# Patient Record
Sex: MALE | Race: WHITE | Employment: FULL TIME | URBAN - METROPOLITAN AREA
[De-identification: names, ages, dates, MRNs, and addresses within clinical notes are randomized per-mention and may not be internally consistent; named-entity substitution may affect disease eponyms.]

---

## 2017-06-15 DIAGNOSIS — E78.5 HYPERLIPIDEMIA LDL GOAL <130: ICD-10-CM

## 2017-06-15 NOTE — TELEPHONE ENCOUNTER
SIMVASTATIN 20MG     Last Written Prescription Date: 5/13/2016  Last Fill Quantity: 90, # refills: 4  Last Office Visit with G, P or [facility] prescribing provider: 11/30/2016       Lab Results   Component Value Date    CHOL 240 05/13/2016     Lab Results   Component Value Date    HDL 39 05/13/2016     Lab Results   Component Value Date     05/13/2016     Lab Results   Component Value Date    TRIG 326 05/13/2016     Lab Results   Component Value Date    CHOLHDLRATIO 5.6 01/23/2015

## 2017-06-16 ENCOUNTER — TRANSFERRED RECORDS (OUTPATIENT)
Dept: HEALTH INFORMATION MANAGEMENT | Facility: CLINIC | Age: 62
End: 2017-06-16

## 2017-06-19 RX ORDER — SIMVASTATIN 20 MG
20 TABLET ORAL AT BEDTIME
Qty: 30 TABLET | Refills: 0 | Status: SHIPPED | OUTPATIENT
Start: 2017-06-19 | End: 2017-07-07

## 2017-06-19 NOTE — TELEPHONE ENCOUNTER
Medication is being filled for 1 time refill only due to:  Patient needs to be seen because it has been more than one year since last visit.   Due for annual fasting labs and an office visit. Please call patient and help schedule this.   Chapis Nicole RN  Triage Nurse

## 2017-07-07 ENCOUNTER — OFFICE VISIT (OUTPATIENT)
Dept: PEDIATRICS | Facility: CLINIC | Age: 62
End: 2017-07-07
Payer: COMMERCIAL

## 2017-07-07 VITALS
SYSTOLIC BLOOD PRESSURE: 132 MMHG | HEART RATE: 68 BPM | HEIGHT: 66 IN | OXYGEN SATURATION: 98 % | BODY MASS INDEX: 29.41 KG/M2 | TEMPERATURE: 98 F | DIASTOLIC BLOOD PRESSURE: 74 MMHG | WEIGHT: 183 LBS

## 2017-07-07 DIAGNOSIS — F41.8 PERFORMANCE ANXIETY: ICD-10-CM

## 2017-07-07 DIAGNOSIS — Z00.00 ROUTINE GENERAL MEDICAL EXAMINATION AT A HEALTH CARE FACILITY: Primary | ICD-10-CM

## 2017-07-07 DIAGNOSIS — E78.5 HYPERLIPIDEMIA LDL GOAL <130: ICD-10-CM

## 2017-07-07 DIAGNOSIS — D22.9 SUSPICIOUS NEVUS: ICD-10-CM

## 2017-07-07 DIAGNOSIS — Z11.59 NEED FOR HEPATITIS C SCREENING TEST: ICD-10-CM

## 2017-07-07 LAB
ALBUMIN SERPL-MCNC: 3.9 G/DL (ref 3.4–5)
ALP SERPL-CCNC: 88 U/L (ref 40–150)
ALT SERPL W P-5'-P-CCNC: 27 U/L (ref 0–70)
ANION GAP SERPL CALCULATED.3IONS-SCNC: 9 MMOL/L (ref 3–14)
AST SERPL W P-5'-P-CCNC: 19 U/L (ref 0–45)
BILIRUB SERPL-MCNC: 0.5 MG/DL (ref 0.2–1.3)
BUN SERPL-MCNC: 18 MG/DL (ref 7–30)
CALCIUM SERPL-MCNC: 9.1 MG/DL (ref 8.5–10.1)
CHLORIDE SERPL-SCNC: 110 MMOL/L (ref 94–109)
CHOLEST SERPL-MCNC: 203 MG/DL
CO2 SERPL-SCNC: 24 MMOL/L (ref 20–32)
CREAT SERPL-MCNC: 1.06 MG/DL (ref 0.66–1.25)
GFR SERPL CREATININE-BSD FRML MDRD: 71 ML/MIN/1.7M2
GLUCOSE SERPL-MCNC: 110 MG/DL (ref 70–99)
HCV AB SERPL QL IA: NORMAL
HDLC SERPL-MCNC: 43 MG/DL
LDLC SERPL CALC-MCNC: 101 MG/DL
NONHDLC SERPL-MCNC: 160 MG/DL
POTASSIUM SERPL-SCNC: 4.3 MMOL/L (ref 3.4–5.3)
PROT SERPL-MCNC: 7.5 G/DL (ref 6.8–8.8)
PSA SERPL-ACNC: 2.16 UG/L (ref 0–4)
SODIUM SERPL-SCNC: 143 MMOL/L (ref 133–144)
TRIGL SERPL-MCNC: 296 MG/DL

## 2017-07-07 PROCEDURE — 36415 COLL VENOUS BLD VENIPUNCTURE: CPT | Performed by: INTERNAL MEDICINE

## 2017-07-07 PROCEDURE — 80053 COMPREHEN METABOLIC PANEL: CPT | Performed by: INTERNAL MEDICINE

## 2017-07-07 PROCEDURE — 80061 LIPID PANEL: CPT | Performed by: INTERNAL MEDICINE

## 2017-07-07 PROCEDURE — 99396 PREV VISIT EST AGE 40-64: CPT | Performed by: INTERNAL MEDICINE

## 2017-07-07 PROCEDURE — 86803 HEPATITIS C AB TEST: CPT | Performed by: INTERNAL MEDICINE

## 2017-07-07 PROCEDURE — G0103 PSA SCREENING: HCPCS | Performed by: INTERNAL MEDICINE

## 2017-07-07 RX ORDER — SIMVASTATIN 20 MG
20 TABLET ORAL AT BEDTIME
Qty: 90 TABLET | Refills: 3 | Status: SHIPPED | OUTPATIENT
Start: 2017-07-07 | End: 2018-08-06

## 2017-07-07 RX ORDER — PROPRANOLOL HYDROCHLORIDE 20 MG/1
10-40 TABLET ORAL 2 TIMES DAILY PRN
Qty: 30 TABLET | Refills: 11 | Status: SHIPPED | OUTPATIENT
Start: 2017-07-07 | End: 2021-09-03

## 2017-07-07 NOTE — PATIENT INSTRUCTIONS
Preventive Health Recommendations    Yearly exam:             See your health care provider every year in order to  o   Review health changes.   o   Discuss preventive care.    o   Review your medicines.     Have a cholesterol test every year.     Have a diabetes test (fasting glucose) every 1-2 years.    Shots: Get a flu shot each year. Get a tetanus shot every 10 years.     Nutrition:    Eat at least 5 servings of fruits and vegetables daily.     Eat whole-grain bread, whole-wheat pasta and brown rice instead of white grains and rice.     Get adequate Calcium and Vitamin D.     Lifestyle    Exercise for at least 150 minutes a week (30 minutes a day, 5 days a week). This will help you control your weight and prevent disease.     Limit alcohol to one drink per day.     No smoking.     Wear sunscreen to prevent skin cancer.     See your dentist every six months for an exam and cleaning.     See your eye doctor every 1 to 2 years.

## 2017-07-07 NOTE — MR AVS SNAPSHOT
After Visit Summary   7/7/2017    Guillermo Strauss    MRN: [id number]           Patient Information     Date Of Birth          1955        Visit Information        Provider Department      7/7/2017 10:00 AM Kelton Lopez MD [facility]        Today's Diagnoses     Routine general medical examination at a health care facility    -  1    Hyperlipidemia LDL goal <130        Suspicious nevus        Performance anxiety        Need for hepatitis C screening test          Care Instructions      Preventive Health Recommendations    Yearly exam:             See your health care provider every year in order to  o   Review health changes.   o   Discuss preventive care.    o   Review your medicines.     Have a cholesterol test every year.     Have a diabetes test (fasting glucose) every 1-2 years.    Shots: Get a flu shot each year. Get a tetanus shot every 10 years.     Nutrition:    Eat at least 5 servings of fruits and vegetables daily.     Eat whole-grain bread, whole-wheat pasta and brown rice instead of white grains and rice.     Get adequate Calcium and Vitamin D.     Lifestyle    Exercise for at least 150 minutes a week (30 minutes a day, 5 days a week). This will help you control your weight and prevent disease.     Limit alcohol to one drink per day.     No smoking.     Wear sunscreen to prevent skin cancer.     See your dentist every six months for an exam and cleaning.     See your eye doctor every 1 to 2 years.            Follow-ups after your visit        Additional Services     DERMATOLOGY REFERRAL       Your provider has referred you to:     [facility] [place] [phone]    [facility] [place] [phone]   [email or web address]    Please be aware that coverage of these services is subject to the terms and limitations of your health insurance plan.  Call member services at your health plan with any benefit or coverage questions.   "    Please bring the following with you to your appointment:    (1) Any X-Rays, CTs or MRIs which have been performed.  Contact the facility where they were done to arrange for  prior to your scheduled appointment.    (2) List of current medications  (3) This referral request   (4) Any documents/labs given to you for this referral                  Who to contact     If you have questions or need follow up information about today's clinic visit or your schedule please contact [facility] directly at [phone].  Normal or non-critical lab and imaging results will be communicated to you by Suliahart, letter or phone within 4 business days after the clinic has received the results. If you do not hear from us within 7 days, please contact the clinic through [employer] or phone. If you have a critical or abnormal lab result, we will notify you by phone as soon as possible.  Submit refill requests through ImpulseSave or call your pharmacy and they will forward the refill request to us. Please allow 3 business days for your refill to be completed.          Additional Information About Your Visit        SuliaharLeikr Information     ImpulseSave gives you secure access to your electronic health record. If you see a primary care provider, you can also send messages to your care team and make appointments. If you have questions, please call your primary care clinic.  If you do not have a primary care provider, please call [phone] and they will assist you.        Care EveryWhere ID     This is your Care EveryWhere ID. This could be used by other organizations to access your [place] medical records  [id number]        Your Vitals Were     Pulse Temperature Height Pulse Oximetry BMI (Body Mass Index)       68 98  F (36.7  C) (Oral) 5' 5.75\" (1.67 m) 98% 29.76 kg/m2        Blood Pressure from Last 3 Encounters:   07/07/17 132/74   05/13/16 138/80   01/23/15 134/86    Weight from Last 3 Encounters:   07/07/17 183 lb (83 kg) "   05/13/16 188 lb (85.3 kg)   01/23/15 185 lb (83.9 kg)              We Performed the Following     Comprehensive metabolic panel     DERMATOLOGY REFERRAL     Hepatitis C Screen Reflex to HCV RNA Quant and Genotype     Lipid Profile with reflex to direct LDL     PSA, screen          Today's Medication Changes          These changes are accurate as of: 7/7/17 10:34 AM.  If you have any questions, ask your nurse or doctor.               These medicines have changed or have updated prescriptions.        Dose/Directions    simvastatin 20 MG tablet   Commonly known as:  ZOCOR   This may have changed:  additional instructions   Used for:  Hyperlipidemia LDL goal <130   Changed by:  Kelton Lopez MD        Dose:  20 mg   Take 1 tablet (20 mg) by mouth At Bedtime   Quantity:  90 tablet   Refills:  3            Where to get your medicines      These medications were sent to [place] [address] [place] [address] [address]     Phone:  [phone]     propranolol 20 MG tablet    simvastatin 20 MG tablet                Primary Care Provider Office Phone # Fax #    Kelton Lopez [phone]       [facility] [address] [place] [address] [address] [place] [address]        Equal Access to Services     [facility]: Hadii aad ku hadasho Soomaali, waaxda luqadaha, qaybta kaalmada adeegyada, marcus fuller . So [facility] [phone].    ATENCIÓN: Si habla español, tiene a delgadillo disposición servicios gratuitos de asistencia lingüística. Llame al [phone].    We comply with applicable federal civil rights laws and [place] laws. We do not discriminate on the basis of race, color, national origin, age, disability sex, sexual orientation or gender identity.            Thank you!     Thank you for choosing [facility]  for your care. Our goal is always to provide you with excellent care. Hearing back from our patients is one way we can  continue to improve our services. Please take a few minutes to complete the written survey that you may receive in the mail after your visit with us. Thank you!             Your Updated Medication List - Protect others around you: Learn how to safely use, store and throw away your medicines at www.disposemymeds.org.          This list is accurate as of: 7/7/17 10:34 AM.  Always use your most recent med list.                   Brand Name Dispense Instructions for use Diagnosis    aspirin 81 MG tablet      1 tab po QD (Once per day)    Routine general medical examination at a health care facility       propranolol 20 MG tablet    INDERAL    30 tablet    Take 0.5-2 tablets (10-40 mg) by mouth 2 times daily as needed    Performance anxiety       simvastatin 20 MG tablet    ZOCOR    90 tablet    Take 1 tablet (20 mg) by mouth At Bedtime    Hyperlipidemia LDL goal <130

## 2017-07-07 NOTE — NURSING NOTE
"Chief Complaint   Patient presents with     Physical       Initial /88 (Cuff Size: Adult Regular)  Pulse 68  Temp 98  F (36.7  C) (Oral)  Ht 5' 5.75\" (1.67 m)  Wt 183 lb (83 kg)  SpO2 98%  BMI 29.76 kg/m2 Estimated body mass index is 29.76 kg/(m^2) as calculated from the following:    Height as of this encounter: 5' 5.75\" (1.67 m).    Weight as of this encounter: 183 lb (83 kg).  Medication Reconciliation: carli Navas   "

## 2018-08-06 DIAGNOSIS — E78.5 HYPERLIPIDEMIA LDL GOAL <130: ICD-10-CM

## 2018-08-06 NOTE — TELEPHONE ENCOUNTER
"Requested Prescriptions   Pending Prescriptions Disp Refills     simvastatin (ZOCOR) 20 MG tablet [Pharmacy Med Name: SIMVASTATIN 20 MG TABLET]    Last Written Prescription Date:  7/7/2017  Last Fill Quantity: 90,  # refills: 3   Last office visit: 7/7/2017 with prescribing provider:  Kelton Lopez     Future Office Visit:     90 tablet 3     Sig: TAKE 1 TABLET (20 MG) BY MOUTH AT BEDTIME    Statins Protocol Failed    8/6/2018  2:03 AM       Failed - LDL on file in past 12 months    Recent Labs   Lab Test  07/07/17   1037   LDL  101*            Failed - Recent (12 mo) or future (30 days) visit within the authorizing provider's specialty    Patient had office visit in the last 12 months or has a visit in the next 30 days with authorizing provider or within the authorizing provider's specialty.  See \"Patient Info\" tab in inbasket, or \"Choose Columns\" in Meds & Orders section of the refill encounter.           Passed - No abnormal creatine kinase in past 12 months    No lab results found.            Passed - Patient is age 18 or older          "

## 2018-08-07 RX ORDER — SIMVASTATIN 20 MG
TABLET ORAL
Qty: 90 TABLET | Refills: 0 | Status: SHIPPED | OUTPATIENT
Start: 2018-08-07 | End: 2018-08-22

## 2018-08-07 NOTE — TELEPHONE ENCOUNTER
Medication is being filled for 1 time refill only due to:  Patient needs labs fasting cholesterol. Patient needs to be seen because it has been more than one year since last visit. Call to schedule appointment with pcp, can do labs at that appointment      Rupali Lara RN

## 2018-08-22 ENCOUNTER — OFFICE VISIT (OUTPATIENT)
Dept: PEDIATRICS | Facility: CLINIC | Age: 63
End: 2018-08-22
Payer: COMMERCIAL

## 2018-08-22 VITALS
SYSTOLIC BLOOD PRESSURE: 134 MMHG | RESPIRATION RATE: 13 BRPM | HEIGHT: 66 IN | HEART RATE: 67 BPM | DIASTOLIC BLOOD PRESSURE: 78 MMHG | TEMPERATURE: 97.9 F | OXYGEN SATURATION: 97 %

## 2018-08-22 DIAGNOSIS — E78.5 HYPERLIPIDEMIA WITH TARGET LDL LESS THAN 130: Primary | ICD-10-CM

## 2018-08-22 LAB
ALBUMIN SERPL-MCNC: 3.8 G/DL (ref 3.4–5)
ALP SERPL-CCNC: 74 U/L (ref 40–150)
ALT SERPL W P-5'-P-CCNC: 25 U/L (ref 0–70)
ANION GAP SERPL CALCULATED.3IONS-SCNC: 6 MMOL/L (ref 3–14)
AST SERPL W P-5'-P-CCNC: 22 U/L (ref 0–45)
BILIRUB SERPL-MCNC: 0.4 MG/DL (ref 0.2–1.3)
BUN SERPL-MCNC: 16 MG/DL (ref 7–30)
CALCIUM SERPL-MCNC: 8.3 MG/DL (ref 8.5–10.1)
CHLORIDE SERPL-SCNC: 110 MMOL/L (ref 94–109)
CHOLEST SERPL-MCNC: 150 MG/DL
CO2 SERPL-SCNC: 26 MMOL/L (ref 20–32)
CREAT SERPL-MCNC: 0.96 MG/DL (ref 0.66–1.25)
GFR SERPL CREATININE-BSD FRML MDRD: 79 ML/MIN/1.7M2
GLUCOSE SERPL-MCNC: 114 MG/DL (ref 70–99)
HDLC SERPL-MCNC: 41 MG/DL
LDLC SERPL CALC-MCNC: 77 MG/DL
NONHDLC SERPL-MCNC: 109 MG/DL
POTASSIUM SERPL-SCNC: 4.1 MMOL/L (ref 3.4–5.3)
PROT SERPL-MCNC: 7.1 G/DL (ref 6.8–8.8)
SODIUM SERPL-SCNC: 142 MMOL/L (ref 133–144)
TRIGL SERPL-MCNC: 158 MG/DL

## 2018-08-22 PROCEDURE — 80061 LIPID PANEL: CPT | Performed by: INTERNAL MEDICINE

## 2018-08-22 PROCEDURE — 80053 COMPREHEN METABOLIC PANEL: CPT | Performed by: INTERNAL MEDICINE

## 2018-08-22 PROCEDURE — 99213 OFFICE O/P EST LOW 20 MIN: CPT | Performed by: INTERNAL MEDICINE

## 2018-08-22 PROCEDURE — 36415 COLL VENOUS BLD VENIPUNCTURE: CPT | Performed by: INTERNAL MEDICINE

## 2018-08-22 RX ORDER — SIMVASTATIN 20 MG
TABLET ORAL
Qty: 90 TABLET | Refills: 3 | Status: SHIPPED | OUTPATIENT
Start: 2018-08-22 | End: 2019-09-04

## 2018-08-22 NOTE — MR AVS SNAPSHOT
After Visit Summary   8/22/2018    Guillermo Strauss    MRN: [id number]           Patient Information     Date Of Birth          1955        Visit Information        Provider Department      8/22/2018 9:00 AM [facility] MD [facility]        Today's Diagnoses     Hyperlipidemia with target LDL less than 130    -  1      Care Instructions    Check labwork today   I will contact you with the results    Next routine visit in about 1 year          Follow-ups after your visit        Follow-up notes from your care team     Return in about 1 year (around 8/22/2019) for Medication Recheck.      Who to contact     If you have questions or need follow up information about today's clinic visit or your schedule please contact [facility] directly at [phone].  Normal or non-critical lab and imaging results will be communicated to you by MyChart, letter or phone within 4 business days after the clinic has received the results. If you do not hear from us within 7 days, please contact the clinic through Crowdparkhart or phone. If you have a critical or abnormal lab result, we will notify you by phone as soon as possible.  Submit refill requests through ClickTale or call your pharmacy and they will forward the refill request to us. Please allow 3 business days for your refill to be completed.          Additional Information About Your Visit        MyChart Information     ClickTale gives you secure access to your electronic health record. If you see a primary care provider, you can also send messages to your care team and make appointments. If you have questions, please call your primary care clinic.  If you do not have a primary care provider, please call [phone] and they will assist you.        Care EveryWhere ID     This is your Care EveryWhere ID. This could be used by other organizations to access your [place] medical records  [id number]        Your Vitals Were     Pulse Temperature  "Respirations Height Pulse Oximetry       67 97.9  F (36.6  C) (Tympanic) 13 5' 5.75\" (1.67 m) 97%        Blood Pressure from Last 3 Encounters:   08/22/18 134/78   07/07/17 132/74   05/13/16 138/80    Weight from Last 3 Encounters:   07/07/17 183 lb (83 kg)   05/13/16 188 lb (85.3 kg)   01/23/15 185 lb (83.9 kg)              We Performed the Following     Comprehensive metabolic panel     Lipid panel reflex to direct LDL Fasting          Where to get your medicines      These medications were sent to [id number] - [place], [place] - [address] AT [facility] [address]  [address], [place] [address]    Hours:  24-hours Phone:  [phone]     simvastatin 20 MG tablet          Primary Care Provider Office Phone # Fax #    Kelton Lopez [phone] [address] [place] [address]        Equal Access to Services     [facility]: Hadii aad ku hadasho Soomaali, waaxda luqadaha, qaybta kaalmada adeegyada, waxay mayelin fuller . So [facility] [phone].    ATENCIÓN: Si habla español, tiene a delgadillo disposición servicios gratuitos de asistencia lingüística. [facility] [phone].    We comply with applicable federal civil rights laws and [place] laws. We do not discriminate on the basis of race, color, national origin, age, disability, sex, sexual orientation, or gender identity.            Thank you!     Thank you for choosing [facility]  for your care. Our goal is always to provide you with excellent care. Hearing back from our patients is one way we can continue to improve our services. Please take a few minutes to complete the written survey that you may receive in the mail after your visit with us. Thank you!             Your Updated Medication List - Protect others around you: Learn how to safely use, store and throw away your medicines at www.disposemymeds.org.          This list is accurate as of 8/22/18  9:11 " AM.  Always use your most recent med list.                   Brand Name Dispense Instructions for use Diagnosis    aspirin 81 MG tablet      1 tab po QD (Once per day)    Routine general medical examination at a health care facility       propranolol 20 MG tablet    INDERAL    30 tablet    Take 0.5-2 tablets (10-40 mg) by mouth 2 times daily as needed    Performance anxiety       simvastatin 20 MG tablet    ZOCOR    90 tablet    TAKE 1 TABLET (20 MG) BY MOUTH AT BEDTIME    Hyperlipidemia with target LDL less than 130

## 2018-08-22 NOTE — PROGRESS NOTES
"  SUBJECTIVE:   Guillermo Strauss is a 63 year old male who presents to clinic today for the following health issues:    Followup of hyperlipidemia - treating with simvastatin    Taking Medication as prescribed: yes    Side Effects:  None    Medication Helping Symptoms:  Yes, as far as patient knows     Reviewed labs.  Lab Results   Component Value Date     07/07/2017     05/13/2016         Problem list and histories reviewed & adjusted, as indicated.    Labs reviewed in EPIC    Reviewed and updated as needed this visit by Provider  Tobacco  Allergies  Meds  Problems  Med Hx  Surg Hx  Fam Hx  Soc Hx          ROS:  Constitutional, HEENT, cardiovascular, pulmonary, gi and gu systems are negative, except as otherwise noted.    OBJECTIVE:     /78 (BP Location: Right arm, Patient Position: Chair, Cuff Size: Adult Regular)  Pulse 67  Temp 97.9  F (36.6  C) (Tympanic)  Resp 13  Ht 5' 5.75\" (1.67 m)  SpO2 97%  There is no height or weight on file to calculate BMI.  GEN: no distress  SKIN: no rashes  HEENT: PERRL. EOMI. TM's clear bilaterally. Nasal mucosa normal. OP moist without lesions.  NECK: Supple. No LAD or TM.  LUNGS: Clear to auscultation bilaterally. No rhonchi, rales, wheezes or retractions.  CV: Regular rate and rhythm.  No murmurs, rubs or gallops. Pulses 2+ radial.     ASSESSMENT/PLAN:       ICD-10-CM    1. Hyperlipidemia with target LDL less than 130 E78.5 simvastatin (ZOCOR) 20 MG tablet     Comprehensive metabolic panel     Lipid panel reflex to direct LDL Fasting     Patient Instructions   Check labwork today   I will contact you with the results    Next routine visit in about 1 year    Kelton Lopez MD  [facility]  "

## 2019-06-08 ENCOUNTER — OFFICE VISIT (OUTPATIENT)
Dept: URGENT CARE | Facility: URGENT CARE | Age: 64
End: 2019-06-08
Payer: COMMERCIAL

## 2019-06-08 ENCOUNTER — ANCILLARY PROCEDURE (OUTPATIENT)
Dept: GENERAL RADIOLOGY | Facility: CLINIC | Age: 64
End: 2019-06-08
Attending: FAMILY MEDICINE
Payer: COMMERCIAL

## 2019-06-08 VITALS
TEMPERATURE: 98.5 F | RESPIRATION RATE: 18 BRPM | OXYGEN SATURATION: 97 % | HEART RATE: 72 BPM | DIASTOLIC BLOOD PRESSURE: 84 MMHG | WEIGHT: 182 LBS | SYSTOLIC BLOOD PRESSURE: 167 MMHG | BODY MASS INDEX: 29.6 KG/M2
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
## 2019-06-08 DIAGNOSIS — S92.155A CLOSED NONDISPLACED AVULSION FRACTURE OF LEFT TALUS, INITIAL ENCOUNTER: Primary | ICD-10-CM

## 2019-06-08 PROCEDURE — 99214 OFFICE O/P EST MOD 30 MIN: CPT | Performed by: FAMILY MEDICINE

## 2019-06-08 PROCEDURE — 73630 X-RAY EXAM OF FOOT: CPT | Mod: LT

## 2019-06-08 NOTE — PATIENT INSTRUCTIONS
Patient Education     Understanding Ankle Sprain    The ankle is the joint where the leg and foot meet. Bones are held in place by connective tissue called ligaments. When ankle ligaments are stretched to the point of pain and injury, it is called an ankle sprain. A sprain can tear the ligaments. These tears can be very small but still cause pain. Ankle sprains can be mild or severe.  What causes an ankle sprain?  A sprain may occur when you twist your ankle or bend it too far. This can happen when you stumble or fall. Things that can make an ankle sprain more likely include:    Having had an ankle sprain before    Playing sports that involve running and jumping. Or playing contact sports such as football or hockey.    Wearing shoes that don t support your feet and ankles well    Having ankles with poor strength and flexibility  Symptoms of an ankle sprain  Symptoms may include:    Pain or soreness in the ankle    Swelling    Redness or bruising    Not being able to walk or put weight on the affected foot    Reduced range of motion in the ankle    A popping or tearing feeling at the time the sprain occurs    An abnormal or dislocated look to the ankle    Instability or too much range of motion in the ankle  Treatment for an ankle sprain  Treatment focuses on reducing pain and swelling, and avoiding further injury. Treatments may include:    Resting the ankle. Avoid putting weight on it. This may mean using crutches until the sprain heals.    Prescription or over-the-counter pain medicines. These help reduce swelling and pain.    Cold packs. These help reduce pain and swelling.    Raising your ankle above your heart. This helps reduce swelling.    Wrapping the ankle with an elastic bandage or ankle brace. This helps reduce swelling and gives some support to the ankle. In rare cases, you may need a cast or boot.    Stretching and other exercises. These improve flexibility and strength.    Heat packs. These may be  recommended before doing ankle exercises.  Possible complications of an ankle sprain  An ankle that has been weakened by a sprain can be more likely to have repeated sprains afterward. Doing exercises to strengthen your ankle and improve balance can reduce your risk for repeated sprains. Other possible complications are long-term (chronic) pain or an ankle that remains unstable.  When to call your healthcare provider  Call your healthcare provider right away if you have any of these:    Fever of 100.4 F (38 C) or higher, or as directed    Pain, numbness, discoloration, or coldness in the foot or toes    Pain that gets worse    Symptoms that don t get better, or get worse    New symptoms   Date Last Reviewed: 3/10/2016    7074-4287 [employer]. [address] [place] [address], [place] [address]. All rights reserved. This information is not intended as a substitute for professional medical care. Always follow your healthcare professional's instructions.

## 2019-06-08 NOTE — PROGRESS NOTES
SUBJECTIVE:   Chief Complaint   Patient presents with     Musculoskeletal Problem     left foot. pt took a fall/slipped. Happened this afternoon at 2. Reports it's painful to walk. Pt took 3 aspirin 1 hour ago.        Joint/Muscle Pain      Onset: a few hours ago    Injury?  Yes Details: while cleaning his garage he slipped and twisted his left foot.     Description:   Location(s): Left foot  Character: Sharp    Intensity: moderate, 8/10    Progression of Symptoms: same    Accompanying Signs & Symptoms:  Other symptoms: He endorsed swelling    History:   Previous similar pain: no      Worsened by    Overuse?: no  Morning Stiffness?:N/A    Alleviating factors:  Improved by: rest/inactivity  Therapies Tried and outcome: Aspirin    Review of Systems review of system negative except as mentioned in HPI.       Past Medical History:   Diagnosis Date     Other and unspecified hyperlipidemia      Preglaucoma, unspecified     follows at [facility]     Family History   Problem Relation Age of Onset     C.A.D. Mother         first MI at age 58     Diabetes No family hx of      Cerebrovascular Disease No family hx of      Cancer - colorectal No family hx of      Prostate Cancer No family hx of      Current Outpatient Medications   Medication Sig Dispense Refill     ASPIRIN 81 MG OR TABS 1 tab po QD (Once per day)       propranolol (INDERAL) 20 MG tablet Take 0.5-2 tablets (10-40 mg) by mouth 2 times daily as needed 30 tablet 11     simvastatin (ZOCOR) 20 MG tablet TAKE 1 TABLET (20 MG) BY MOUTH AT BEDTIME 90 tablet 3     Social History     Tobacco Use     Smoking status: Former Smoker     Smokeless tobacco: Never Used     Tobacco comment: quit in about 1985 after about 30 pack years   Substance Use Topics     Alcohol use: Yes     Alcohol/week: 0.0 oz       OBJECTIVE  /84 (BP Location: Left arm, Patient Position: Sitting, Cuff Size: Adult Large)   Pulse 72   Temp 98.5  F (36.9  C) (Oral)   Resp 18   Wt 82.6 kg (182 lb)    SpO2 97%   BMI 29.60 kg/m      Physical Exam   Constitutional: He appears well-developed and well-nourished. No distress.   Eyes: Conjunctivae are normal.   Musculoskeletal:        Left knee: Normal.        Left ankle: He exhibits swelling. He exhibits normal range of motion, no ecchymosis, no deformity, no laceration and normal pulse. Tenderness. Lateral malleolus and AITFL tenderness found. No medial malleolus, no CF ligament, no head of 5th metatarsal and no proximal fibula tenderness found.   Skin: Skin is warm. He is not diaphoretic.   Psychiatric: He has a normal mood and affect.       Labs:  No results found for this or any previous visit (from the past 24 hour(s)).    X-Ray was done, my findings are:  Possible avulsion fracture of the superor talus     ASSESSMENT:    Guillermo was seen today for urgent care and musculoskeletal problem.    Diagnoses and all orders for this visit:    Closed nondisplaced avulsion fracture of left talus, initial encounter  -     order for DME; Equipment being ordered: Cam boot  -     SPORTS MEDICINE REFERRAL  -     Tylenol/ibuprofen as needed for pain   -     Icing and rest     Other orders  -     XR Foot Left G/E 3 Views; Future    Donal Barrios MD

## 2019-09-04 DIAGNOSIS — E78.5 HYPERLIPIDEMIA WITH TARGET LDL LESS THAN 130: ICD-10-CM

## 2019-09-04 RX ORDER — SIMVASTATIN 20 MG
TABLET ORAL
Qty: 90 TABLET | Refills: 0 | Status: SHIPPED | OUTPATIENT
Start: 2019-09-04 | End: 2019-12-08

## 2019-09-04 NOTE — TELEPHONE ENCOUNTER
"Requested Prescriptions   Pending Prescriptions Disp Refills     simvastatin (ZOCOR) 20 MG tablet [Pharmacy Med Name: SIMVASTATIN 20MG TABLETS]    Last Written Prescription Date:  8/22/2018  Last Fill Quantity: 90,  # refills: 3   Last office visit: 8/22/2018 with prescribing provider:  Kelton Lopez     Future Office Visit:     90 tablet 0     Sig: TAKE 1 TABLET BY MOUTH AT BEDTIME       Statins Protocol Failed - 9/4/2019  3:51 AM        Failed - LDL on file in past 12 months     Recent Labs   Lab Test 08/22/18  0910   LDL 77             Failed - Recent (12 mo) or future (30 days) visit within the authorizing provider's specialty     Patient had office visit in the last 12 months or has a visit in the next 30 days with authorizing provider or within the authorizing provider's specialty.  See \"Patient Info\" tab in inbasket, or \"Choose Columns\" in Meds & Orders section of the refill encounter.              Passed - No abnormal creatine kinase in past 12 months     No lab results found.             Passed - Medication is active on med list        Passed - Patient is age 18 or older          "

## 2019-10-03 ENCOUNTER — HEALTH MAINTENANCE LETTER (OUTPATIENT)
Age: 64
End: 2019-10-03

## 2019-12-16 ENCOUNTER — HEALTH MAINTENANCE LETTER (OUTPATIENT)
Age: 64
End: 2019-12-16

## 2020-01-29 NOTE — PROGRESS NOTES
"SUBJECTIVE:   Guillermo Strauss is a 64 year old male who presents for Preventive Visit.    Are you in the first 12 months of your Medicare coverage?  No    Healthy Habits:     In general, how would you rate your overall health?  Good    Frequency of exercise:  4-5 days/week    Duration of exercise:  45-60 minutes    Do you usually eat at least 4 servings of fruit and vegetables a day, include whole grains    & fiber and avoid regularly eating high fat or \"junk\" foods?  Yes    Taking medications regularly:  Yes    Medication side effects:  None    Ability to successfully perform activities of daily living:  No assistance needed    Home Safety:  No safety concerns identified    Hearing Impairment:  Difficulty following a conversation in a noisy restaurant or crowded room and difficulty understanding soft or whispered speech    In the past 6 months, have you been bothered by leaking of urine?  No    In general, how would you rate your overall mental or emotional health?  Good      PHQ-2 Total Score: 0    Additional concerns today:  No    Do you feel safe in your environment? Yes    Have you ever done Advance Care Planning? (For example, a Health Directive, POLST, or a discussion with a medical provider or your loved ones about your wishes): No, advance care planning information given to patient to review.  Patient plans to discuss their wishes with loved ones or provider.      Hyperlipidemia. Tolerating statin.    Hx of smoking. Nothing for years. discussed AAA screen recommendation.     Fall risk  Fallen 2 or more times in the past year?: No  Any fall with injury in the past year?: No    Cognitive Screening   1) Repeat 3 items (Leader, Season, Table)    2) Clock draw: NORMAL  3) 3 item recall: Recalls 3 objects  Results: NORMAL clock, 1-2 items recalled: COGNITIVE IMPAIRMENT LESS LIKELY    Mini-CogTM Copyright LUPE Rodriguez. Licensed by the author for use in [facility]; reprinted with permission (cherry@.[facility]). " All rights reserved.      Do you have sleep apnea, excessive snoring or daytime drowsiness?: no    Reviewed and updated as needed this visit by Provider  Tobacco  Allergies  Meds  Problems  Med Hx  Surg Hx  Fam Hx        Social History     Tobacco Use     Smoking status: Former Smoker     Smokeless tobacco: Never Used     Tobacco comment: quit in about 1985 after about 30 pack years   Substance Use Topics     Alcohol use: Yes     Alcohol/week: 0.0 standard drinks     If you drink alcohol do you typically have >3 drinks per day or >7 drinks per week? No    Alcohol Use 1/31/2020   Prescreen: >3 drinks/day or >7 drinks/week? Yes   Prescreen: >3 drinks/day or >7 drinks/week? -   AUDIT SCORE  4     AUDIT - Alcohol Use Disorders Identification Test - Reproduced from the World Health Organization Audit 2001 (Second Edition) 1/31/2020   1.  How often do you have a drink containing alcohol? 2 to 3 times a week   2.  How many drinks containing alcohol do you have on a typical day when you are drinking? 3 or 4   3.  How often do you have five or more drinks on one occasion? Never   4.  How often during the last year have you found that you were not able to stop drinking once you had started? Never   5.  How often during the last year have you failed to do what was normally expected of you because of drinking? Never   6.  How often during the last year have you needed a first drink in the morning to get yourself going after a heavy drinking session? Never   7.  How often during the last year have you had a feeling of guilt or remorse after drinking? Never   8.  How often during the last year have you been unable to remember what happened the night before because of your drinking? Never   9.  Have you or someone else been injured because of your drinking? No   10. Has a relative, friend, doctor or other health care worker been concerned about your drinking or suggested you cut down? No   TOTAL SCORE 4         Current  "providers sharing in care for this patient include:   Patient Care Team:  Kelton Lopez MD as PCP - General  Kelton Lopez MD as Assigned PCP    The following health maintenance items are reviewed in Epic and correct as of today:  Health Maintenance   Topic Date Due     ANNUAL REVIEW OF HM ORDERS  1955     HIV SCREENING  06/23/1969     ZOSTER IMMUNIZATION (1 of 2) 06/23/2004     ADVANCE CARE PLANNING  07/25/2017     MEDICARE ANNUAL WELLNESS VISIT  06/23/2019     FALL RISK ASSESSMENT  06/23/2019     PNEUMOCOCCAL IMMUNIZATION 65+ LOW/MEDIUM RISK (1 of 2 - PCV13) 06/23/2019     AORTIC ANEURYSM SCREENING (SYSTEM ASSIGNED)  06/23/2019     PHQ-2  01/01/2020     DTAP/TDAP/TD IMMUNIZATION (4 - Td) 08/31/2020     LIPID  08/22/2023     COLONOSCOPY  02/01/2026     HEPATITIS C SCREENING  Completed     INFLUENZA VACCINE  Completed     IPV IMMUNIZATION  Aged Out     MENINGITIS IMMUNIZATION  Aged Out     Review of Systems   Constitutional: Negative for chills and fever.   HENT: Positive for hearing loss. Negative for congestion, ear pain and sore throat.    Eyes: Negative for pain and visual disturbance.   Respiratory: Negative for cough and shortness of breath.    Cardiovascular: Negative for chest pain and palpitations.   Gastrointestinal: Negative for abdominal pain, constipation, diarrhea, heartburn, hematochezia and nausea.   Genitourinary: Negative for discharge, dysuria, frequency, genital sores, hematuria, impotence and urgency.   Musculoskeletal: Positive for arthralgias. Negative for joint swelling and myalgias.   Skin: Negative for rash.   Neurological: Negative for dizziness, weakness, headaches and paresthesias.   Psychiatric/Behavioral: Negative for mood changes. The patient is not nervous/anxious.        OBJECTIVE:   /80   Pulse 73   Temp 98.5  F (36.9  C) (Oral)   Resp 16   Ht 1.702 m (5' 7\")   Wt 80.8 kg (178 lb 1.6 oz)   SpO2 96%   BMI 27.89 kg/m   Estimated body mass index is 27.89 kg/m  " "as calculated from the following:    Height as of this encounter: 1.702 m (5' 7\").    Weight as of this encounter: 80.8 kg (178 lb 1.6 oz).  Physical Exam  GENERAL: healthy, alert and no distress  EYES: Eyes grossly normal to inspection, PERRL and conjunctivae and sclerae normal  HENT: ear canals and TM's normal, nose and mouth without ulcers or lesions  NECK: no adenopathy, no asymmetry, masses, or scars and thyroid normal to palpation  RESP: lungs clear to auscultation - no rales, rhonchi or wheezes  CV: regular rate and rhythm, normal S1 S2, no S3 or S4, no murmur, click or rub, no peripheral edema and peripheral pulses strong  ABDOMEN: soft, nontender, no hepatosplenomegaly, no masses and bowel sounds normal  MS: no gross musculoskeletal defects noted, no edema  SKIN: no suspicious lesions or rashes  NEURO: Normal strength and tone, mentation intact and speech normal  PSYCH: mentation appears normal, affect normal/bright      ASSESSMENT / PLAN:       ICD-10-CM    1. Routine general medical examination at a health care facility Z00.00 Comprehensive metabolic panel     Lipid panel reflex to direct LDL Fasting     Prostate spec antigen screen   2. Hyperlipidemia with target LDL less than 130 E78.5 simvastatin (ZOCOR) 20 MG tablet     Lipid panel reflex to direct LDL Fasting   3. Personal history of tobacco use, presenting hazards to health Z87.891 US Abdominal Aorta Imaging       COUNSELING:  Reviewed preventive health counseling, as reflected in patient instructions    Estimated body mass index is 27.89 kg/m  as calculated from the following:    Height as of this encounter: 1.702 m (5' 7\").    Weight as of this encounter: 80.8 kg (178 lb 1.6 oz).         reports that he has quit smoking. He has never used smokeless tobacco.      Appropriate preventive services were discussed with this patient, including applicable screening as appropriate for cardiovascular disease, diabetes, osteopenia/osteoporosis, and glaucoma. "  As appropriate for age/gender, discussed screening for colorectal cancer, prostate cancer. Checklist reviewing preventive services available has been given to the patient.    Reviewed patients plan of care and provided an AVS. The Basic Care Plan (routine screening as documented in Health Maintenance) for Guillermo meets the Care Plan requirement. This Care Plan has been established and reviewed with the Patient.    Counseling Resources:  ATP IV Guidelines  Pooled Cohorts Equation Calculator  Breast Cancer Risk Calculator  FRAX Risk Assessment  ICSI Preventive Guidelines  Dietary Guidelines for Americans, 2010  USDA's MyPlate  ASA Prophylaxis  Lung CA Screening    Kelton Lopez MD  [facility]    Identified Health Risks:

## 2020-01-29 NOTE — PATIENT INSTRUCTIONS
Check labwork today   I will contact you with the results    You will be contacted to schedule an ultrasound of your abdominal aorta   Or call Radiology [phone]       Patient Education   Personalized Prevention Plan  You are due for the preventive services outlined below.  Your care team is available to assist you in scheduling these services.  If you have already completed any of these items, please share that information with your care team to update in your medical record.  Health Maintenance Due   Topic Date Due     Zoster (Shingles) Vaccine (1 of 2) 2004     Discuss Advance Care Planning  2017     Pneumococcal Vaccine (1 of 2 - PCV13) 2019     AORTIC ANEURYSM SCREENING (SYSTEM ASSIGNED)  2019         Vaccine recommendations:  Shingles vaccine and Pneumonia vaccine    2 moths later -  Shingles booster and Tetanus booster

## 2020-01-30 ENCOUNTER — PRE VISIT (OUTPATIENT)
Dept: PEDIATRICS | Facility: CLINIC | Age: 65
End: 2020-01-30

## 2020-01-30 NOTE — TELEPHONE ENCOUNTER
Pre-Visit Planning     Future Appointments   Date Time Provider Department Center   1/31/2020  9:50 AM Kelton Lopez MD [facility]     Arrival Time for this Appointment:    Appointment Notes for this encounter:   Data Unavailable    Questionnaires Reviewed/Assigned  No additional questionnaires are needed        Patient preferred phone number: [phone]    Unable to reach. Left voicemail confirming appointment.

## 2020-01-31 ENCOUNTER — OFFICE VISIT (OUTPATIENT)
Dept: PEDIATRICS | Facility: CLINIC | Age: 65
End: 2020-01-31
Payer: COMMERCIAL

## 2020-01-31 VITALS
BODY MASS INDEX: 27.95 KG/M2 | DIASTOLIC BLOOD PRESSURE: 80 MMHG | TEMPERATURE: 98.5 F | OXYGEN SATURATION: 96 % | SYSTOLIC BLOOD PRESSURE: 136 MMHG | HEIGHT: 67 IN | WEIGHT: 178.1 LBS | RESPIRATION RATE: 16 BRPM | HEART RATE: 73 BPM

## 2020-01-31 DIAGNOSIS — Z00.00 ROUTINE GENERAL MEDICAL EXAMINATION AT A HEALTH CARE FACILITY: Primary | ICD-10-CM

## 2020-01-31 DIAGNOSIS — Z87.891 PERSONAL HISTORY OF TOBACCO USE, PRESENTING HAZARDS TO HEALTH: ICD-10-CM

## 2020-01-31 DIAGNOSIS — E78.5 HYPERLIPIDEMIA WITH TARGET LDL LESS THAN 130: ICD-10-CM

## 2020-01-31 LAB
ALBUMIN SERPL-MCNC: 4 G/DL (ref 3.4–5)
ALP SERPL-CCNC: 87 U/L (ref 40–150)
ALT SERPL W P-5'-P-CCNC: 33 U/L (ref 0–70)
ANION GAP SERPL CALCULATED.3IONS-SCNC: 6 MMOL/L (ref 3–14)
AST SERPL W P-5'-P-CCNC: 21 U/L (ref 0–45)
BILIRUB SERPL-MCNC: 0.4 MG/DL (ref 0.2–1.3)
BUN SERPL-MCNC: 18 MG/DL (ref 7–30)
CALCIUM SERPL-MCNC: 9.1 MG/DL (ref 8.5–10.1)
CHLORIDE SERPL-SCNC: 107 MMOL/L (ref 94–109)
CHOLEST SERPL-MCNC: 171 MG/DL
CO2 SERPL-SCNC: 27 MMOL/L (ref 20–32)
CREAT SERPL-MCNC: 1.04 MG/DL (ref 0.66–1.25)
GFR SERPL CREATININE-BSD FRML MDRD: 75 ML/MIN/{1.73_M2}
GLUCOSE SERPL-MCNC: 119 MG/DL (ref 70–99)
HDLC SERPL-MCNC: 38 MG/DL
LDLC SERPL CALC-MCNC: 84 MG/DL
NONHDLC SERPL-MCNC: 133 MG/DL
POTASSIUM SERPL-SCNC: 4 MMOL/L (ref 3.4–5.3)
PROT SERPL-MCNC: 7.4 G/DL (ref 6.8–8.8)
PSA SERPL-ACNC: 2.13 UG/L (ref 0–4)
SODIUM SERPL-SCNC: 140 MMOL/L (ref 133–144)
TRIGL SERPL-MCNC: 246 MG/DL

## 2020-01-31 PROCEDURE — 80053 COMPREHEN METABOLIC PANEL: CPT | Performed by: INTERNAL MEDICINE

## 2020-01-31 PROCEDURE — 36415 COLL VENOUS BLD VENIPUNCTURE: CPT | Performed by: INTERNAL MEDICINE

## 2020-01-31 PROCEDURE — 99397 PER PM REEVAL EST PAT 65+ YR: CPT | Performed by: INTERNAL MEDICINE

## 2020-01-31 PROCEDURE — 80061 LIPID PANEL: CPT | Performed by: INTERNAL MEDICINE

## 2020-01-31 PROCEDURE — G0103 PSA SCREENING: HCPCS | Performed by: INTERNAL MEDICINE

## 2020-01-31 RX ORDER — SIMVASTATIN 20 MG
TABLET ORAL
Qty: 90 TABLET | Refills: 3 | Status: SHIPPED | OUTPATIENT
Start: 2020-01-31 | End: 2021-03-02

## 2020-01-31 ASSESSMENT — ENCOUNTER SYMPTOMS
CONSTIPATION: 0
HEMATURIA: 0
EYE PAIN: 0
HEADACHES: 0
NERVOUS/ANXIOUS: 0
SORE THROAT: 0
DIZZINESS: 0
ARTHRALGIAS: 1
WEAKNESS: 0
DIARRHEA: 0
JOINT SWELLING: 0
ABDOMINAL PAIN: 0
PALPITATIONS: 0
COUGH: 0
NAUSEA: 0
MYALGIAS: 0
SHORTNESS OF BREATH: 0
FEVER: 0
FREQUENCY: 0
HEMATOCHEZIA: 0
HEARTBURN: 0
CHILLS: 0
PARESTHESIAS: 0
DYSURIA: 0

## 2020-01-31 ASSESSMENT — MIFFLIN-ST. JEOR: SCORE: 1551.49

## 2020-01-31 ASSESSMENT — ACTIVITIES OF DAILY LIVING (ADL): CURRENT_FUNCTION: NO ASSISTANCE NEEDED

## 2020-02-14 ENCOUNTER — HOSPITAL ENCOUNTER (OUTPATIENT)
Dept: ULTRASOUND IMAGING | Facility: CLINIC | Age: 65
Discharge: HOME | End: 2020-02-14
Attending: INTERNAL MEDICINE | Admitting: INTERNAL MEDICINE
Payer: COMMERCIAL

## 2020-02-14 DIAGNOSIS — Z87.891 PERSONAL HISTORY OF TOBACCO USE, PRESENTING HAZARDS TO HEALTH: ICD-10-CM

## 2020-02-14 PROCEDURE — 76775 US EXAM ABDO BACK WALL LIM: CPT

## 2020-06-30 ENCOUNTER — OFFICE VISIT (OUTPATIENT)
Dept: PEDIATRICS | Facility: CLINIC | Age: 65
End: 2020-06-30
Payer: COMMERCIAL

## 2020-06-30 ENCOUNTER — TELEPHONE (OUTPATIENT)
Dept: PEDIATRICS | Facility: CLINIC | Age: 65
End: 2020-06-30

## 2020-06-30 VITALS
OXYGEN SATURATION: 98 % | DIASTOLIC BLOOD PRESSURE: 86 MMHG | BODY MASS INDEX: 28.93 KG/M2 | WEIGHT: 180 LBS | SYSTOLIC BLOOD PRESSURE: 160 MMHG | TEMPERATURE: 97.8 F | HEART RATE: 76 BPM | RESPIRATION RATE: 16 BRPM | HEIGHT: 66 IN

## 2020-06-30 DIAGNOSIS — I10 BENIGN ESSENTIAL HYPERTENSION: ICD-10-CM

## 2020-06-30 PROCEDURE — 99213 OFFICE O/P EST LOW 20 MIN: CPT | Performed by: INTERNAL MEDICINE

## 2020-06-30 SDOH — HEALTH STABILITY: MENTAL HEALTH: HOW MANY DRINKS CONTAINING ALCOHOL DO YOU HAVE ON A TYPICAL DAY WHEN YOU ARE DRINKING?: 1 OR 2

## 2020-06-30 SDOH — HEALTH STABILITY: MENTAL HEALTH: HOW OFTEN DO YOU HAVE SIX OR MORE DRINKS ON ONE OCCASION?: NEVER

## 2020-06-30 SDOH — HEALTH STABILITY: MENTAL HEALTH: HOW OFTEN DO YOU HAVE A DRINK CONTAINING ALCOHOL?: MONTHLY OR LESS

## 2020-06-30 ASSESSMENT — MIFFLIN-ST. JEOR: SCORE: 1539.22

## 2020-06-30 NOTE — TELEPHONE ENCOUNTER
Called patient to schedule appointment with Dr. Lopez for elevated blood pressures (please see patient's original Zukihart message).  Left message requesting to schedule an in-office visit for this afternoon or tomorrow afternoon.  Provided direct number for scheduling.  Upon return call, please schedule patient in a virtual slot with Dr. Lopez this afternoon for office visit, or tomorrow afternoon if no availability.  Additionally sent Zukihart message as well to patient with above scheduling instructions.     Kelsey Lynch

## 2020-06-30 NOTE — PATIENT INSTRUCTIONS
Increase your exercise regimen   Goal of 30+ minutes per day, 4+ days per week    Check your blood pressure periodically   Optimal BP readings: <130 / <85   Too high: >140 / >90   Very high: >180 / >105    If your pressures continue to be high over the next 2-3 months, plan to start a daily medication for your blood pressure    Options:   Long acting propranolol    Lisinopril

## 2020-06-30 NOTE — PROGRESS NOTES
"Subjective     Guillermo Strauss is a 65 year old male who presents to clinic today for the following health issues:    HPI   Hypertension Follow-up      Do you check your blood pressure regularly outside of the clinic? Yes     Are you following a low salt diet? Yes    Are your blood pressures ever more than 140 on the top number (systolic) OR more   than 90 on the bottom number (diastolic), for example 140/90? Yes      How many servings of fruits and vegetables do you eat daily?  4 or more    On average, how many sweetened beverages do you drink each day (Examples: soda, juice, sweet tea, etc.  Do NOT count diet or artificially sweetened beverages)?   0    How many days per week do you exercise enough to make your heart beat faster? 7    How many minutes a day do you exercise enough to make your heart beat faster? 30 - 60    How many days per week do you miss taking your medication? 0    BP Readings from Last 3 Encounters:   06/30/20 (!) 160/86   01/31/20 136/80   06/08/19 167/84     No prior treatment for high blood pressure.  Is treated for anxiety prn with propranolol.    Has been checking BP at home daily for the past few weeks.  Readings typically 1405-160 / 90's    No cardiac sx such as CP, palpitations, PND, orthopnea, HILLS or peripheral edema.  Discussed options to treat high blood pressure.             Objective    BP (!) 160/86 (BP Location: Right arm, Patient Position: Sitting, Cuff Size: Adult Regular)   Pulse 76   Temp 97.8  F (36.6  C) (Tympanic)   Resp 16   Ht 1.676 m (5' 6\")   Wt 81.6 kg (180 lb)   SpO2 98%   BMI 29.05 kg/m    Body mass index is 29.05 kg/m .  Physical Exam   GEN: No distress  SKIN: No rashes noted  HEENT: PERRL. No nasal discharge.   NECK: Supple  LUNGS: CTA frank.  CV: Regular rate and rhythm.  No murmurs, rubs or gallops.  EXTR: no edema          Assessment & Plan       ICD-10-CM    1. Benign essential hypertension  I10      New formal dx of HTN.  As above, discussed management " options.  Since is stage 1, OK to start w/ lifestyle changes especially as his exercise regimen has been reduced d/t social distancing.    Patient Instructions   Increase your exercise regimen   Goal of 30+ minutes per day, 4+ days per week    Check your blood pressure periodically   Optimal BP readings: <130 / <85   Too high: >140 / >90   Very high: >180 / >105    If your pressures continue to be high over the next 2-3 months, plan to start a daily medication for your blood pressure    Options:   Long acting propranolol    Lisinopril       Kelton Lopez MD  [facility]

## 2021-01-15 ENCOUNTER — HEALTH MAINTENANCE LETTER (OUTPATIENT)
Age: 66
End: 2021-01-15

## 2021-02-28 DIAGNOSIS — E78.5 HYPERLIPIDEMIA WITH TARGET LDL LESS THAN 130: ICD-10-CM

## 2021-03-02 RX ORDER — SIMVASTATIN 20 MG
TABLET ORAL
Qty: 90 TABLET | Refills: 0 | Status: SHIPPED | OUTPATIENT
Start: 2021-03-02 | End: 2021-06-01

## 2021-03-02 NOTE — TELEPHONE ENCOUNTER
Called patient and he would like an early Friday am appointment. Was unable to commit to April but will check his schedule and call back.  Ginny Bennett LPN

## 2021-03-02 NOTE — TELEPHONE ENCOUNTER
Medication is being filled for 1 time refill only due to:  Patient needs to be seen because it has been more than one year since last visit for fasting labs, also needs BP recheck  Routed to TC for scheduling  Prescription approved per [facility] Refill Protocol.  Grace Dalal RN, BSN  Message handled by CLINIC NURSE.

## 2021-03-21 ENCOUNTER — HEALTH MAINTENANCE LETTER (OUTPATIENT)
Age: 66
End: 2021-03-21

## 2021-05-31 DIAGNOSIS — E78.5 HYPERLIPIDEMIA WITH TARGET LDL LESS THAN 130: ICD-10-CM

## 2021-06-01 RX ORDER — SIMVASTATIN 20 MG
TABLET ORAL
Qty: 30 TABLET | Refills: 0 | Status: SHIPPED | OUTPATIENT
Start: 2021-06-01 | End: 2021-07-02

## 2021-07-02 DIAGNOSIS — E78.5 HYPERLIPIDEMIA WITH TARGET LDL LESS THAN 130: ICD-10-CM

## 2021-07-02 RX ORDER — SIMVASTATIN 20 MG
TABLET ORAL
Qty: 30 TABLET | Refills: 0 | Status: SHIPPED | OUTPATIENT
Start: 2021-07-02 | End: 2021-08-05

## 2021-07-02 NOTE — TELEPHONE ENCOUNTER
1st attempt: left VM to return call. If pt calls back, please assist in scheduling annual physical with PCP. Needs visit prior to further refills.     Kimberly Zhang MA 3:46 PM 7/2/2021

## 2021-07-02 NOTE — TELEPHONE ENCOUNTER
Routing refill request to provider for review/approval because:  Labs not current:  LDL  Patient needs to be seen because it has been more than 1 year since last office visit.    Deisy Domingo RN

## 2021-07-02 NOTE — LETTER
[facility]  [address]  [address]  [place] [address]  Phone: [phone]  Fax: [phone]        July 9, 2021      Guillermo Strauss                                                                                                                                [address]  [place] [address]            Dear Mr. Strauss,    We are concerned about your health care. We recently provided you with a medication refill. Many medications require routine follow-up with your Doctor.      At this time we ask that: You schedule an appointment for your annual physical.    Your prescription: Has been refilled for 1 month so you may have time for the above noted follow-up. You will need to schedule an appointment prior to further refills.     Please call us at [phone] to set up an appointment or if you have further questions.     Thank you,      [facility] Care Team

## 2021-08-04 DIAGNOSIS — E78.5 HYPERLIPIDEMIA WITH TARGET LDL LESS THAN 130: ICD-10-CM

## 2021-08-05 RX ORDER — SIMVASTATIN 20 MG
TABLET ORAL
Qty: 30 TABLET | Refills: 0 | Status: SHIPPED | OUTPATIENT
Start: 2021-08-05 | End: 2021-09-03

## 2021-08-06 ENCOUNTER — TRANSFERRED RECORDS (OUTPATIENT)
Dept: HEALTH INFORMATION MANAGEMENT | Facility: CLINIC | Age: 66
End: 2021-08-06

## 2021-09-03 ENCOUNTER — OFFICE VISIT (OUTPATIENT)
Dept: PEDIATRICS | Facility: CLINIC | Age: 66
End: 2021-09-03
Payer: COMMERCIAL

## 2021-09-03 VITALS
BODY MASS INDEX: 28.82 KG/M2 | HEART RATE: 61 BPM | SYSTOLIC BLOOD PRESSURE: 132 MMHG | RESPIRATION RATE: 18 BRPM | WEIGHT: 179.3 LBS | HEIGHT: 66 IN | DIASTOLIC BLOOD PRESSURE: 84 MMHG | TEMPERATURE: 97.4 F | OXYGEN SATURATION: 98 %

## 2021-09-03 DIAGNOSIS — Z00.00 ENCOUNTER FOR MEDICARE ANNUAL WELLNESS EXAM: Primary | ICD-10-CM

## 2021-09-03 DIAGNOSIS — Z12.5 SCREENING FOR PROSTATE CANCER: ICD-10-CM

## 2021-09-03 DIAGNOSIS — E78.5 HYPERLIPIDEMIA WITH TARGET LDL LESS THAN 130: ICD-10-CM

## 2021-09-03 LAB
ALBUMIN SERPL-MCNC: 3.5 G/DL (ref 3.4–5)
ALP SERPL-CCNC: 73 U/L (ref 40–150)
ALT SERPL W P-5'-P-CCNC: 29 U/L (ref 0–70)
ANION GAP SERPL CALCULATED.3IONS-SCNC: 8 MMOL/L (ref 3–14)
AST SERPL W P-5'-P-CCNC: 22 U/L (ref 0–45)
BILIRUB SERPL-MCNC: 0.4 MG/DL (ref 0.2–1.3)
BUN SERPL-MCNC: 16 MG/DL (ref 7–30)
CALCIUM SERPL-MCNC: 8.8 MG/DL (ref 8.5–10.1)
CHLORIDE BLD-SCNC: 108 MMOL/L (ref 94–109)
CHOLEST SERPL-MCNC: 165 MG/DL
CO2 SERPL-SCNC: 23 MMOL/L (ref 20–32)
CREAT SERPL-MCNC: 1.01 MG/DL (ref 0.66–1.25)
FASTING STATUS PATIENT QL REPORTED: YES
GFR SERPL CREATININE-BSD FRML MDRD: 77 ML/MIN/1.73M2
GLUCOSE BLD-MCNC: 109 MG/DL (ref 70–99)
HDLC SERPL-MCNC: 40 MG/DL
LDLC SERPL CALC-MCNC: 98 MG/DL
NONHDLC SERPL-MCNC: 125 MG/DL
POTASSIUM BLD-SCNC: 3.8 MMOL/L (ref 3.4–5.3)
PROT SERPL-MCNC: 6.9 G/DL (ref 6.8–8.8)
PSA SERPL-MCNC: 2.65 UG/L (ref 0–4)
SODIUM SERPL-SCNC: 139 MMOL/L (ref 133–144)
TRIGL SERPL-MCNC: 137 MG/DL

## 2021-09-03 PROCEDURE — 36415 COLL VENOUS BLD VENIPUNCTURE: CPT | Performed by: INTERNAL MEDICINE

## 2021-09-03 PROCEDURE — G0402 INITIAL PREVENTIVE EXAM: HCPCS | Performed by: INTERNAL MEDICINE

## 2021-09-03 PROCEDURE — G0103 PSA SCREENING: HCPCS | Performed by: INTERNAL MEDICINE

## 2021-09-03 PROCEDURE — 80061 LIPID PANEL: CPT | Performed by: INTERNAL MEDICINE

## 2021-09-03 PROCEDURE — 80053 COMPREHEN METABOLIC PANEL: CPT | Performed by: INTERNAL MEDICINE

## 2021-09-03 RX ORDER — SIMVASTATIN 20 MG
TABLET ORAL
Qty: 90 TABLET | Refills: 3 | Status: SHIPPED | OUTPATIENT
Start: 2021-09-03 | End: 2022-09-20

## 2021-09-03 SDOH — ECONOMIC STABILITY: TRANSPORTATION INSECURITY
IN THE PAST 12 MONTHS, HAS LACK OF TRANSPORTATION KEPT YOU FROM MEETINGS, WORK, OR FROM GETTING THINGS NEEDED FOR DAILY LIVING?: NO

## 2021-09-03 SDOH — ECONOMIC STABILITY: TRANSPORTATION INSECURITY
IN THE PAST 12 MONTHS, HAS THE LACK OF TRANSPORTATION KEPT YOU FROM MEDICAL APPOINTMENTS OR FROM GETTING MEDICATIONS?: NO

## 2021-09-03 SDOH — HEALTH STABILITY: PHYSICAL HEALTH: ON AVERAGE, HOW MANY DAYS PER WEEK DO YOU ENGAGE IN MODERATE TO STRENUOUS EXERCISE (LIKE A BRISK WALK)?: 4 DAYS

## 2021-09-03 SDOH — ECONOMIC STABILITY: FOOD INSECURITY: WITHIN THE PAST 12 MONTHS, YOU WORRIED THAT YOUR FOOD WOULD RUN OUT BEFORE YOU GOT MONEY TO BUY MORE.: NEVER TRUE

## 2021-09-03 SDOH — ECONOMIC STABILITY: INCOME INSECURITY: HOW HARD IS IT FOR YOU TO PAY FOR THE VERY BASICS LIKE FOOD, HOUSING, MEDICAL CARE, AND HEATING?: NOT HARD AT ALL

## 2021-09-03 SDOH — ECONOMIC STABILITY: FOOD INSECURITY: WITHIN THE PAST 12 MONTHS, THE FOOD YOU BOUGHT JUST DIDN'T LAST AND YOU DIDN'T HAVE MONEY TO GET MORE.: NEVER TRUE

## 2021-09-03 SDOH — ECONOMIC STABILITY: INCOME INSECURITY: IN THE LAST 12 MONTHS, WAS THERE A TIME WHEN YOU WERE NOT ABLE TO PAY THE MORTGAGE OR RENT ON TIME?: NO

## 2021-09-03 SDOH — HEALTH STABILITY: PHYSICAL HEALTH: ON AVERAGE, HOW MANY MINUTES DO YOU ENGAGE IN EXERCISE AT THIS LEVEL?: 50 MIN

## 2021-09-03 ASSESSMENT — ENCOUNTER SYMPTOMS
HEADACHES: 0
ABDOMINAL PAIN: 0
PALPITATIONS: 0
DIARRHEA: 0
SORE THROAT: 0
COUGH: 0
HEARTBURN: 0
PARESTHESIAS: 0
MYALGIAS: 1
FREQUENCY: 0
EYE PAIN: 0
NAUSEA: 0
HEMATURIA: 0
CONSTIPATION: 0
NERVOUS/ANXIOUS: 0
ARTHRALGIAS: 1
SHORTNESS OF BREATH: 0
HEMATOCHEZIA: 0
DIZZINESS: 0
WEAKNESS: 0
FEVER: 0
JOINT SWELLING: 0
DYSURIA: 0
CHILLS: 0

## 2021-09-03 ASSESSMENT — MIFFLIN-ST. JEOR: SCORE: 1534.54

## 2021-09-03 ASSESSMENT — SOCIAL DETERMINANTS OF HEALTH (SDOH)
IN A TYPICAL WEEK, HOW MANY TIMES DO YOU TALK ON THE PHONE WITH FAMILY, FRIENDS, OR NEIGHBORS?: THREE TIMES A WEEK
DO YOU BELONG TO ANY CLUBS OR ORGANIZATIONS SUCH AS CHURCH GROUPS UNIONS, FRATERNAL OR ATHLETIC GROUPS, OR SCHOOL GROUPS?: NO
HOW OFTEN DO YOU GET TOGETHER WITH FRIENDS OR RELATIVES?: TWICE A WEEK
HOW OFTEN DO YOU ATTEND CHURCH OR RELIGIOUS SERVICES?: 1 TO 4 TIMES PER YEAR

## 2021-09-03 ASSESSMENT — LIFESTYLE VARIABLES
HOW OFTEN DO YOU HAVE A DRINK CONTAINING ALCOHOL: 2-3 TIMES A WEEK
HOW MANY STANDARD DRINKS CONTAINING ALCOHOL DO YOU HAVE ON A TYPICAL DAY: 1 OR 2
HOW OFTEN DO YOU HAVE SIX OR MORE DRINKS ON ONE OCCASION: NEVER

## 2021-09-03 ASSESSMENT — ACTIVITIES OF DAILY LIVING (ADL): CURRENT_FUNCTION: NO ASSISTANCE NEEDED

## 2021-09-03 NOTE — PATIENT INSTRUCTIONS
Patient Education   Personalized Prevention Plan  You are due for the preventive services outlined below.  Your care team is available to assist you in scheduling these services.  If you have already completed any of these items, please share that information with your care team to update in your medical record.  Health Maintenance Due   Topic Date Due     ANNUAL REVIEW OF HM ORDERS  Never done     Pneumococcal Vaccine (1 of 1 - PPSV23) Never done     Diptheria Tetanus Pertussis (DTAP/TDAP/TD) Vaccine (4 - Td or Tdap) 08/31/2020     Annual Wellness Visit  01/31/2021     FALL RISK ASSESSMENT  01/31/2021     Flu Vaccine (1) 09/01/2021

## 2021-09-03 NOTE — PROGRESS NOTES
"SUBJECTIVE:   Guillermo Strauss is a 66 year old male who presents for Preventive Visit.      Patient has been advised of split billing requirements and indicates understanding: Yes   Are you in the first 12 months of your Medicare coverage?  Yes,  Visual Acuity:  Right Eye: 20/25   Left Eye: 20/20  Both Eyes: 20/25    Healthy Habits:     In general, how would you rate your overall health?  Good    Frequency of exercise:  4-5 days/week    Do you usually eat at least 4 servings of fruit and vegetables a day, include whole grains    & fiber and avoid regularly eating high fat or \"junk\" foods?  Yes    Taking medications regularly:  Yes    Medication side effects:  Not applicable    Ability to successfully perform activities of daily living:  No assistance needed    Home Safety:  No safety concerns identified    Hearing Impairment:  No hearing concerns    In the past 6 months, have you been bothered by leaking of urine? Yes    In general, how would you rate your overall mental or emotional health?  Good      PHQ-2 Total Score: 0    Additional concerns today:  No    Do you feel safe in your environment? Yes    Have you ever done Advance Care Planning? (For example, a Health Directive, POLST, or a discussion with a medical provider or your loved ones about your wishes): No, advance care planning information given to patient to review.  Patient declined advance care planning discussion at this time.     Hyperlipidemia. Treating w/ statin. No med side effects noted. Is fasting this am.  Lab Results   Component Value Date    LDL 84 01/31/2020    LDL 77 08/22/2018       Fall risk  Any fall with injury in the past year?: No    Cognitive Screening   1) Repeat 3 items (Leader, Season, Table)    2) Clock draw: NORMAL  3) 3 item recall: Recalls NO objects   Results: ABNORMAL clock, 1-2 items recalled: POSSIBLE COGNITIVE IMPAIRMENT,     Mini-CogTM Copyright S Jennifer. Licensed by the author for use in [facility]; " reprinted with permission ([email or web address]). All rights reserved.      Mentation is intact based on clinical exam today. Will follow.    Do you have sleep apnea, excessive snoring or daytime drowsiness?: no     Social History     Tobacco Use     Smoking status: Former Smoker     Packs/day: 0.00     Years: 0.00     Pack years: 0.00     Smokeless tobacco: Never Used     Tobacco comment: quit in about 1985 after about 30 pack years   Substance Use Topics     Alcohol use: Yes     Alcohol/week: 0.0 standard drinks         Alcohol Use 9/3/2021   Prescreen: >3 drinks/day or >7 drinks/week? No   Prescreen: >3 drinks/day or >7 drinks/week? -   AUDIT SCORE  -       Current providers sharing in care for this patient include:   Patient Care Team:  Kelton Lopez MD as PCP - General  Kelton Lopez MD as Assigned PCP    The following health maintenance items are reviewed in Epic and correct as of today:  Health Maintenance Due   Topic Date Due     ANNUAL REVIEW OF HM ORDERS  Never done     Pneumococcal Vaccine: 65+ Years (1 of 1 - PPSV23) Never done     DTAP/TDAP/TD IMMUNIZATION (4 - Td or Tdap) 08/31/2020     FALL RISK ASSESSMENT  01/31/2021     INFLUENZA VACCINE (1) 09/01/2021       Review of Systems   Constitutional: Negative for chills and fever.   HENT: Negative for congestion, ear pain, hearing loss and sore throat.    Eyes: Negative for pain and visual disturbance.   Respiratory: Negative for cough and shortness of breath.    Cardiovascular: Negative for chest pain, palpitations and peripheral edema.   Gastrointestinal: Negative for abdominal pain, constipation, diarrhea, heartburn, hematochezia and nausea.   Genitourinary: Negative for discharge, dysuria, frequency, genital sores, hematuria, impotence and urgency.   Musculoskeletal: Positive for arthralgias and myalgias. Negative for joint swelling.   Skin: Negative for rash.   Neurological: Negative for dizziness, weakness, headaches and paresthesias.  "  Psychiatric/Behavioral: Negative for mood changes. The patient is not nervous/anxious.        OBJECTIVE:   /84 (BP Location: Right arm, Patient Position: Sitting, Cuff Size: Adult Regular)   Pulse 61   Temp 97.4  F (36.3  C) (Tympanic)   Resp 18   Ht 1.682 m (5' 6.22\")   Wt 81.3 kg (179 lb 4.8 oz)   SpO2 98%   BMI 28.75 kg/m   Estimated body mass index is 28.75 kg/m  as calculated from the following:    Height as of this encounter: 1.682 m (5' 6.22\").    Weight as of this encounter: 81.3 kg (179 lb 4.8 oz).  Physical Exam  GENERAL: healthy, alert and no distress  EYES: Eyes grossly normal to inspection, PERRL and conjunctivae and sclerae normal  HENT: ear canals and TM's normal  NECK: no adenopathy, no asymmetry, masses, or scars and thyroid normal to palpation  RESP: lungs clear to auscultation - no rales, rhonchi or wheezes  CV: regular rate and rhythm, normal S1 S2, no murmur, no peripheral edema and peripheral pulses strong  ABDOMEN: soft, nontender, bowel sounds normal  MS: no gross musculoskeletal defects noted, no edema  SKIN: no suspicious lesions or rashes  NEURO: Normal strength and tone, mentation intact and speech normal  PSYCH: mentation appears normal, affect normal/bright      ASSESSMENT / PLAN:       ICD-10-CM    1. Encounter for Medicare annual wellness exam  Z00.00 Comprehensive metabolic panel     Lipid panel reflex to direct LDL Fasting     Prostate Specific Antigen Screen   2. Hyperlipidemia with target LDL less than 130  E78.5 simvastatin (ZOCOR) 20 MG tablet     Lipid panel reflex to direct LDL Fasting   3. Screening for prostate cancer  Z12.5 Prostate Specific Antigen Screen       COUNSELING:  Reviewed preventive health counseling, as reflected in patient instructions    Estimated body mass index is 28.75 kg/m  as calculated from the following:    Height as of this encounter: 1.682 m (5' 6.22\").    Weight as of this encounter: 81.3 kg (179 lb 4.8 oz).        He reports that he " has quit smoking. He smoked 0.00 packs per day for 0.00 years. He has never used smokeless tobacco.      Appropriate preventive services were discussed with this patient, including applicable screening as appropriate for cardiovascular disease, diabetes, osteopenia/osteoporosis, and glaucoma.  As appropriate for age/gender, discussed screening for colorectal cancer, prostate cancer. Checklist reviewing preventive services available has been given to the patient.    Reviewed patients plan of care and provided an AVS. The Basic Care Plan (routine screening as documented in Health Maintenance) for Guillermo meets the Care Plan requirement. This Care Plan has been established and reviewed with the Patient.    Counseling Resources:  ATP IV Guidelines  Pooled Cohorts Equation Calculator  Breast Cancer Risk Calculator  Breast Cancer: Medication to Reduce Risk  FRAX Risk Assessment  ICSI Preventive Guidelines  Dietary Guidelines for Americans, 2010  USDA's MyPlate  ASA Prophylaxis  Lung CA Screening    Kelton Lopez MD  [facility]    Identified Health Risks:

## 2021-09-05 ENCOUNTER — HEALTH MAINTENANCE LETTER (OUTPATIENT)
Age: 66
End: 2021-09-05

## 2022-02-17 ENCOUNTER — TRANSFERRED RECORDS (OUTPATIENT)
Dept: HEALTH INFORMATION MANAGEMENT | Facility: CLINIC | Age: 67
End: 2022-02-17
Payer: COMMERCIAL

## 2022-08-18 ENCOUNTER — TRANSFERRED RECORDS (OUTPATIENT)
Dept: HEALTH INFORMATION MANAGEMENT | Facility: CLINIC | Age: 67
End: 2022-08-18

## 2022-09-18 DIAGNOSIS — E78.5 HYPERLIPIDEMIA WITH TARGET LDL LESS THAN 130: ICD-10-CM

## 2022-09-20 RX ORDER — SIMVASTATIN 20 MG
TABLET ORAL
Qty: 90 TABLET | Refills: 0 | Status: SHIPPED | OUTPATIENT
Start: 2022-09-20 | End: 2022-12-19

## 2022-09-20 NOTE — TELEPHONE ENCOUNTER
Routing refill request to provider for review/approval because:  - Labs not current: LDL     LDL Cholesterol Calculated   Date Value Ref Range Status   09/03/2021 98 <=100 mg/dL Final     Comment:     Age 0-19 years:  Desirable: 0-110 mg/dL   Borderline high: 110-129 mg/dL   High: >= 130 mg/dL    Age 20 years and older:  Desirable: <100mg/dL  Above desirable: 100-129 mg/dL   Borderline high: 130-159 mg/dL   High: 160-189 mg/dL   Very high: >= 190 mg/dL   01/31/2020 84 <100 mg/dL Final     Comment:     Desirable:       <100 mg/dl     - Patient needs to be seen because it has been more than 1 year since last office visit.  Patient was last seen on 9/21/2021.   JONNY, please assist patient in scheduling an appointment.     Darline AVILA RN   Patient Advocate Liaison (PAL)  [facility] [place]

## 2022-09-23 NOTE — TELEPHONE ENCOUNTER
Patient read MyC on 9/22. Will postpone until 9/27 to follow up.    Debbie Lopez on 9/23/2022 at 9:04 AM

## 2022-09-29 DIAGNOSIS — E78.5 HYPERLIPIDEMIA WITH TARGET LDL LESS THAN 130: ICD-10-CM

## 2022-09-30 RX ORDER — SIMVASTATIN 20 MG
TABLET ORAL
Qty: 90 TABLET | Refills: 0 | OUTPATIENT
Start: 2022-09-30

## 2022-09-30 NOTE — TELEPHONE ENCOUNTER
Patient given a 3 month supply of medication. Refusing refill request and closing encounter.     Kimberlee Shah RN on 9/30/2022 at 10:47 AM

## 2022-10-23 ENCOUNTER — HEALTH MAINTENANCE LETTER (OUTPATIENT)
Age: 67
End: 2022-10-23

## 2022-12-19 ENCOUNTER — OFFICE VISIT (OUTPATIENT)
Dept: PEDIATRICS | Facility: CLINIC | Age: 67
End: 2022-12-19
Payer: COMMERCIAL

## 2022-12-19 VITALS
BODY MASS INDEX: 29.44 KG/M2 | HEART RATE: 67 BPM | WEIGHT: 183.2 LBS | TEMPERATURE: 97.5 F | SYSTOLIC BLOOD PRESSURE: 132 MMHG | DIASTOLIC BLOOD PRESSURE: 82 MMHG | HEIGHT: 66 IN | RESPIRATION RATE: 16 BRPM | OXYGEN SATURATION: 100 %

## 2022-12-19 DIAGNOSIS — Z12.5 SCREENING FOR PROSTATE CANCER: ICD-10-CM

## 2022-12-19 DIAGNOSIS — E78.5 HYPERLIPIDEMIA WITH TARGET LDL LESS THAN 130: ICD-10-CM

## 2022-12-19 DIAGNOSIS — Z00.00 ENCOUNTER FOR MEDICARE ANNUAL WELLNESS EXAM: Primary | ICD-10-CM

## 2022-12-19 LAB
CHOLEST SERPL-MCNC: 193 MG/DL
HDLC SERPL-MCNC: 45 MG/DL
LDLC SERPL CALC-MCNC: 100 MG/DL
NONHDLC SERPL-MCNC: 148 MG/DL
PSA SERPL-MCNC: 2.88 NG/ML (ref 0–4.5)
TRIGL SERPL-MCNC: 242 MG/DL

## 2022-12-19 PROCEDURE — 36415 COLL VENOUS BLD VENIPUNCTURE: CPT | Performed by: INTERNAL MEDICINE

## 2022-12-19 PROCEDURE — 80061 LIPID PANEL: CPT | Performed by: INTERNAL MEDICINE

## 2022-12-19 PROCEDURE — 80053 COMPREHEN METABOLIC PANEL: CPT | Performed by: INTERNAL MEDICINE

## 2022-12-19 PROCEDURE — G0103 PSA SCREENING: HCPCS | Performed by: INTERNAL MEDICINE

## 2022-12-19 PROCEDURE — 99397 PER PM REEVAL EST PAT 65+ YR: CPT | Performed by: INTERNAL MEDICINE

## 2022-12-19 RX ORDER — SIMVASTATIN 20 MG
TABLET ORAL
Qty: 90 TABLET | Refills: 4 | Status: SHIPPED | OUTPATIENT
Start: 2022-12-19 | End: 2023-12-20

## 2022-12-19 SDOH — HEALTH STABILITY: PHYSICAL HEALTH: ON AVERAGE, HOW MANY DAYS PER WEEK DO YOU ENGAGE IN MODERATE TO STRENUOUS EXERCISE (LIKE A BRISK WALK)?: 4 DAYS

## 2022-12-19 SDOH — ECONOMIC STABILITY: FOOD INSECURITY: WITHIN THE PAST 12 MONTHS, YOU WORRIED THAT YOUR FOOD WOULD RUN OUT BEFORE YOU GOT MONEY TO BUY MORE.: NEVER TRUE

## 2022-12-19 SDOH — ECONOMIC STABILITY: FOOD INSECURITY: WITHIN THE PAST 12 MONTHS, THE FOOD YOU BOUGHT JUST DIDN'T LAST AND YOU DIDN'T HAVE MONEY TO GET MORE.: NEVER TRUE

## 2022-12-19 SDOH — HEALTH STABILITY: PHYSICAL HEALTH: ON AVERAGE, HOW MANY MINUTES DO YOU ENGAGE IN EXERCISE AT THIS LEVEL?: 60 MIN

## 2022-12-19 SDOH — ECONOMIC STABILITY: INCOME INSECURITY: IN THE LAST 12 MONTHS, WAS THERE A TIME WHEN YOU WERE NOT ABLE TO PAY THE MORTGAGE OR RENT ON TIME?: NO

## 2022-12-19 SDOH — ECONOMIC STABILITY: INCOME INSECURITY: HOW HARD IS IT FOR YOU TO PAY FOR THE VERY BASICS LIKE FOOD, HOUSING, MEDICAL CARE, AND HEATING?: NOT HARD AT ALL

## 2022-12-19 ASSESSMENT — ENCOUNTER SYMPTOMS
HEARTBURN: 0
DIZZINESS: 0
HEMATOCHEZIA: 0
PALPITATIONS: 0
JOINT SWELLING: 0
FREQUENCY: 0
ARTHRALGIAS: 0
DYSURIA: 0
PARESTHESIAS: 0
DIARRHEA: 0
FEVER: 0
HEADACHES: 0
SORE THROAT: 0
HEMATURIA: 0
COUGH: 0
CHILLS: 0
NERVOUS/ANXIOUS: 0
WEAKNESS: 0
SHORTNESS OF BREATH: 0
NAUSEA: 0
MYALGIAS: 0
EYE PAIN: 0
ABDOMINAL PAIN: 0
CONSTIPATION: 0

## 2022-12-19 ASSESSMENT — LIFESTYLE VARIABLES
SKIP TO QUESTIONS 9-10: 1
AUDIT-C TOTAL SCORE: 3
HOW OFTEN DO YOU HAVE SIX OR MORE DRINKS ON ONE OCCASION: NEVER
HOW MANY STANDARD DRINKS CONTAINING ALCOHOL DO YOU HAVE ON A TYPICAL DAY: 1 OR 2
HOW OFTEN DO YOU HAVE A DRINK CONTAINING ALCOHOL: 2-3 TIMES A WEEK

## 2022-12-19 ASSESSMENT — SOCIAL DETERMINANTS OF HEALTH (SDOH)
IN A TYPICAL WEEK, HOW MANY TIMES DO YOU TALK ON THE PHONE WITH FAMILY, FRIENDS, OR NEIGHBORS?: MORE THAN THREE TIMES A WEEK
HOW OFTEN DO YOU ATTEND CHURCH OR RELIGIOUS SERVICES?: 1 TO 4 TIMES PER YEAR
HOW OFTEN DO YOU GET TOGETHER WITH FRIENDS OR RELATIVES?: THREE TIMES A WEEK
DO YOU BELONG TO ANY CLUBS OR ORGANIZATIONS SUCH AS CHURCH GROUPS UNIONS, FRATERNAL OR ATHLETIC GROUPS, OR SCHOOL GROUPS?: NO

## 2022-12-19 ASSESSMENT — ACTIVITIES OF DAILY LIVING (ADL): CURRENT_FUNCTION: NO ASSISTANCE NEEDED

## 2022-12-19 NOTE — PROGRESS NOTES
"SUBJECTIVE:   Paresh is a 67 year old who presents for Preventive Visit.  Patient has been advised of split billing requirements and indicates understanding: Yes  Are you in the first 12 months of your Medicare coverage?  No    Healthy Habits:     In general, how would you rate your overall health?  Good    Frequency of exercise:  2-3 days/week    Duration of exercise:  15-30 minutes    Do you usually eat at least 4 servings of fruit and vegetables a day, include whole grains    & fiber and avoid regularly eating high fat or \"junk\" foods?  Yes    Taking medications regularly:  Yes    Medication side effects:  Not applicable    Ability to successfully perform activities of daily living:  No assistance needed    Home Safety:  No safety concerns identified    Hearing Impairment:  Difficulty following a conversation in a noisy restaurant or crowded room    In the past 6 months, have you been bothered by leaking of urine? Yes    In general, how would you rate your overall mental or emotional health?  Excellent      PHQ-2 Total Score: 0    Additional concerns today:  No      Have you ever done Advance Care Planning? (For example, a Health Directive, POLST, or a discussion with a medical provider or your loved ones about your wishes): No, advance care planning information given to patient to review.  Patient plans to discuss their wishes with loved ones or provider.      Hyperlipidemia. Treating with simvastatin.  Lab Results   Component Value Date     12/19/2022    LDL 98 09/03/2021    LDL 84 01/31/2020    LDL 77 08/22/2018         Fall risk    Cognitive Screening   1) Repeat 3 items (Leader, Season, Table)    2) Clock draw: NORMAL  3) 3 item recall: Recalls 3 objects  Results: 3 items recalled: COGNITIVE IMPAIRMENT LESS LIKELY    Mini-CogTM Copyright LUPE Rodriguez. Licensed by the author for use in [facility] [employer]; reprinted with permission (cherry@.[facility]). All rights reserved.      Do you have sleep apnea, " excessive snoring or daytime drowsiness?: no    Reviewed and updated as needed this visit by clinical staff   Tobacco  Allergies  Meds              Reviewed and updated as needed this visit by Provider                 Social History     Tobacco Use     Smoking status: Former     Packs/day: 0.00     Years: 0.00     Pack years: 0.00     Types: Cigarettes     Smokeless tobacco: Never     Tobacco comments:     quit in about 1985 after about 30 pack years   Substance Use Topics     Alcohol use: Yes       Alcohol Use 12/19/2022   Prescreen: >3 drinks/day or >7 drinks/week? No   Prescreen: >3 drinks/day or >7 drinks/week? -   AUDIT SCORE  -         Current providers sharing in care for this patient include:   Patient Care Team:  Kelton Lopez MD as PCP - General  Kelton Lopez MD as Assigned PCP    The following health maintenance items are reviewed in Epic and correct as of today:  Health Maintenance   Topic Date Due     ANNUAL REVIEW OF  ORDERS  Never done     LUNG CANCER SCREENING  Never done     Pneumococcal Vaccine: 65+ Years (1 - PCV) Never done     DTAP/TDAP/TD IMMUNIZATION (4 - Td or Tdap) 08/31/2020     MEDICARE ANNUAL WELLNESS VISIT  09/03/2022     FALL RISK ASSESSMENT  12/19/2023     COLORECTAL CANCER SCREENING  02/01/2026     LIPID  09/03/2026     ADVANCE CARE PLANNING  09/03/2026     HEPATITIS C SCREENING  Completed     PHQ-2 (once per calendar year)  Completed     INFLUENZA VACCINE  Completed     ZOSTER IMMUNIZATION  Completed     AORTIC ANEURYSM SCREENING (SYSTEM ASSIGNED)  Completed     COVID-19 Vaccine  Completed     IPV IMMUNIZATION  Aged Out     MENINGITIS IMMUNIZATION  Aged Out           Review of Systems   Constitutional: Negative for chills and fever.   HENT: Positive for hearing loss. Negative for congestion, ear pain and sore throat.    Eyes: Negative for pain and visual disturbance.   Respiratory: Negative for cough and shortness of breath.    Cardiovascular: Negative for chest pain,  "palpitations and peripheral edema.   Gastrointestinal: Negative for abdominal pain, constipation, diarrhea, heartburn, hematochezia and nausea.   Genitourinary: Positive for urgency. Negative for dysuria, frequency, genital sores, hematuria, impotence and penile discharge.   Musculoskeletal: Negative for arthralgias, joint swelling and myalgias.   Skin: Negative for rash.   Neurological: Negative for dizziness, weakness, headaches and paresthesias.   Psychiatric/Behavioral: Negative for mood changes. The patient is not nervous/anxious.          OBJECTIVE:   /82 (BP Location: Right arm, Patient Position: Sitting, Cuff Size: Adult Large)   Pulse 67   Temp 97.5  F (36.4  C) (Oral)   Resp 16   Ht 1.676 m (5' 6\")   Wt 83.1 kg (183 lb 3.2 oz)   SpO2 100%   BMI 29.57 kg/m   Estimated body mass index is 29.57 kg/m  as calculated from the following:    Height as of this encounter: 1.676 m (5' 6\").    Weight as of this encounter: 83.1 kg (183 lb 3.2 oz).  Physical Exam  GENERAL: healthy, alert and no distress  EYES: PERRL, EOMI  HENT: ear canals and TM's normal  NECK: no adenopathy  RESP: lungs clear to auscultation - no rales, rhonchi or wheezes  CV: regular rate and rhythm, normal S1 S2, no murmur, no peripheral edema and peripheral pulses strong  ABDOMEN: soft, nontender, bowel sounds normal  MS: no gross musculoskeletal defects noted  SKIN: no suspicious lesions or rashes  NEURO: Normal strength and tone  PSYCH: mentation appears normal, affect normal/bright      ASSESSMENT / PLAN:       ICD-10-CM    1. Encounter for Medicare annual wellness exam  Z00.00 Comprehensive metabolic panel      2. Hyperlipidemia with target LDL less than 130  E78.5 simvastatin (ZOCOR) 20 MG tablet     Lipid panel reflex to direct LDL Fasting      3. Screening for prostate cancer  Z12.5 Prostate Specific Antigen Screen        Overall doing well  Continue w/ simvastatin      COUNSELING:  Reviewed preventive health counseling, as " "reflected in patient instructions      BMI:   Estimated body mass index is 29.57 kg/m  as calculated from the following:    Height as of this encounter: 1.676 m (5' 6\").    Weight as of this encounter: 83.1 kg (183 lb 3.2 oz).         He reports that he has quit smoking. His smoking use included cigarettes. He has never used smokeless tobacco.      Appropriate preventive services were discussed with this patient, including applicable screening as appropriate for cardiovascular disease, diabetes, osteopenia/osteoporosis, and glaucoma.  As appropriate for age/gender, discussed screening for colorectal cancer, prostate cancer. Checklist reviewing preventive services available has been given to the patient.    Reviewed patients plan of care and provided an AVS. The Basic Care Plan (routine screening as documented in Health Maintenance) for Guillermo meets the Care Plan requirement. This Care Plan has been established and reviewed with the Patient.      Kelton Lopez MD  [facility]    Identified Health Risks:  "

## 2022-12-19 NOTE — PATIENT INSTRUCTIONS
Patient Education   Personalized Prevention Plan  You are due for the preventive services outlined below.  Your care team is available to assist you in scheduling these services.  If you have already completed any of these items, please share that information with your care team to update in your medical record.  Health Maintenance Due   Topic Date Due     ANNUAL REVIEW OF HM ORDERS  Never done     LUNG CANCER SCREENING  Never done     Pneumococcal Vaccine (1 - PCV) Never done     Diptheria Tetanus Pertussis (DTAP/TDAP/TD) Vaccine (4 - Td or Tdap) 08/31/2020     Annual Wellness Visit  09/03/2022

## 2022-12-20 LAB
ALBUMIN SERPL BCG-MCNC: 4.4 G/DL (ref 3.5–5.2)
ALP SERPL-CCNC: 76 U/L (ref 40–129)
ALT SERPL W P-5'-P-CCNC: 25 U/L (ref 10–50)
ANION GAP SERPL CALCULATED.3IONS-SCNC: 14 MMOL/L (ref 7–15)
AST SERPL W P-5'-P-CCNC: 25 U/L (ref 10–50)
BILIRUB SERPL-MCNC: 0.4 MG/DL
BUN SERPL-MCNC: 17.2 MG/DL (ref 8–23)
CALCIUM SERPL-MCNC: 8.9 MG/DL (ref 8.8–10.2)
CHLORIDE SERPL-SCNC: 106 MMOL/L (ref 98–107)
CREAT SERPL-MCNC: 0.97 MG/DL (ref 0.67–1.17)
DEPRECATED HCO3 PLAS-SCNC: 23 MMOL/L (ref 22–29)
GFR SERPL CREATININE-BSD FRML MDRD: 85 ML/MIN/1.73M2
GLUCOSE SERPL-MCNC: 98 MG/DL (ref 70–99)
POTASSIUM SERPL-SCNC: 3.9 MMOL/L (ref 3.4–5.3)
PROT SERPL-MCNC: 7.1 G/DL (ref 6.4–8.3)
SODIUM SERPL-SCNC: 143 MMOL/L (ref 136–145)

## 2023-02-16 ENCOUNTER — TRANSFERRED RECORDS (OUTPATIENT)
Dept: HEALTH INFORMATION MANAGEMENT | Facility: CLINIC | Age: 68
End: 2023-02-16
Payer: COMMERCIAL

## 2023-09-28 ENCOUNTER — TRANSFERRED RECORDS (OUTPATIENT)
Dept: HEALTH INFORMATION MANAGEMENT | Facility: CLINIC | Age: 68
End: 2023-09-28
Payer: COMMERCIAL

## 2023-12-17 ASSESSMENT — ACTIVITIES OF DAILY LIVING (ADL): CURRENT_FUNCTION: NO ASSISTANCE NEEDED

## 2023-12-17 ASSESSMENT — ENCOUNTER SYMPTOMS
CONSTIPATION: 0
HEARTBURN: 0
ARTHRALGIAS: 1
FREQUENCY: 0
DIZZINESS: 0
NAUSEA: 0
WEAKNESS: 0
PALPITATIONS: 0
FEVER: 0
EYE PAIN: 0
COUGH: 0
PARESTHESIAS: 0
ABDOMINAL PAIN: 0
HEADACHES: 0
DIARRHEA: 0
JOINT SWELLING: 0
SORE THROAT: 0
MYALGIAS: 0
CHILLS: 0
HEMATOCHEZIA: 0
NERVOUS/ANXIOUS: 0
HEMATURIA: 0
SHORTNESS OF BREATH: 0
DYSURIA: 0

## 2023-12-17 NOTE — COMMUNITY RESOURCES LIST (ENGLISH)
12/17/2023   [facility]  N/A  For questions about this resource list or additional care needs, please contact your primary care clinic or care manager.  Phone: [phone]   Email: N/A   Address: [address] [place] [address]   Hours: N/A        Hotlines and Helplines       Hotline - Housing crisis  1  [facility] Distance: 5.25 miles      Phone/Virtual   [address] [place] [address]  Language: English  Hours: Mon - Sun Open 24 Hours   Phone: (928) 891-6185 Email: communications@[facility].org Website: https://[facility].org/oursaviourshousing/     2  [facility] (Main Office) Distance: 5.96 miles      Phone/Virtual   [address] [place] [address]  Language: English  Hours: Mon - Sun Open 24 Hours   Phone: (388) 894-2486 Email: info@[facility].org Website: [email or web address] [email or web address].[email or web address]          Housing       Coordinated Entry access point  3  [facility] - Coordinated Access to Housing and Shelter ([facility] - Coordinated Access - Coordinated Entry access point Distance: 2.7 miles      In-Person, Phone/Virtual   [address] [place] [address]  Language: English  Hours: Mon - Fri 8:00 AM - 4:30 PM  Fees: Free   Phone: (367) 247-1530 Website: https://www.[facility]./residents/assistance-support/assistance/housing-services-support     4  [facility] Distance: 4.01 miles      In-Person, Phone/Virtual   [address] [place] [address]  Language: English  Hours: Mon - Fri 8:30 AM - 4:30 PM  Fees: Free   Phone: (849) 228-2701 Email: info@[facility].org Website: https://www.[facility].org/locations/[facility]/     Drop-in center or day shelter  5  [facility] Distance: 5.53 miles      In-Person   [address] [place] [address]  Language: English, Swazi, Latvian  Hours: Mon - Sat 7:00 AM - 3:00 PM  Fees: Free, Self  Pay   Phone: (592) 209-8916 Email: info@[employer] Website: https://www.[employer].org/locations/opportunity-center/     6  [facility] Distance: 5.58 miles      In-Person   [address] [place] [address]  Language: English  Hours: Mon - Fri 9:00 AM - 4:00 PM  Fees: Free   Phone: (361) 796-7304 Email: frontbeccak@[employer] Website: http://[employer]     Housing search assistance  7  [facility] - Online Housing Search Assistance Distance: 1.39 miles      Phone/Virtual   [address] [place] [address]  Language: English  Hours: Mon - Sun Open 24 Hours  Fees: Free   Phone: (747) 227-7255 Email: lavonne@[employer] Website: https://[employer]/     8  [facility] - Mental Health Crisis Housing Search Assistance Distance: 2.74 miles      In-Person, Phone/Virtual   [address] [facility] [address] [place] [address]  Language: English  Hours: Mon - Tue 8:00 AM - 4:30 PM , Wed 8:00 AM - 6:00 PM , Thu - Fri 8:00 AM - 4:30 PM  Fees: Free   Phone: (714) 964-8282 Email: [facility]. Website: https://www.[facility]./residents/health-medical/clinics-services/mental-health/adult-mental-health     Shelter for families  9  [facility] Distance: 18.73 miles      In-Person   [address] [place] [address]  Language: English  Hours: Mon - Fri 3:00 PM - 9:00 AM , Sat - Sun Open 24 Hours  Fees: Free   Phone: (243) 175-6599 Ext.1 Website: https://www.[employer].org/2020/07/03/emergency-family-shelter/     Shelter for individuals  10  [facility] - Coordinated Access to Housing and Shelter ([facility]) - Coordinated Access - Emergency housing Distance: 2.7 miles      In-Person, Phone/Virtual   [address] [place] [address]  Language: English  Hours: Mon - Fri 8:00 AM - 4:30 PM  Fees: Free   Phone: (953) 720-9076 Website:  https://www.[facility]./residents/assistance-support/assistance/housing-services-support     11  [facility] [place] [facility] [place] [facility] Distance: 3.97 miles      In-Person   [address] [place] [address]  Language: English  Hours: Mon - Sun 5:00 PM - 10:00 AM  Fees: Free, Self Pay   Phone: (917) 390-1093 Email: info@[facility] Website: https://www.[facility]/locations/[facility]/          Important Numbers & Websites       Emergency Services   911  [facility] Services   311  Poison Control   (667) 656-5281  Suicide Prevention Lifeline   (318) 353-8139 (TALK)  Child Abuse Hotline   (891) 850-7476 (4-A-Child)  Sexual Assault Hotline   (869) 572-1620 (HOPE)  National Runaway Safeline   (629) 316-6570 (RUNAWAY)  All-Options Talkline   (153) 184-1902  Substance Abuse Referral   (963) 885-9672 (HELP)

## 2023-12-19 NOTE — COMMUNITY RESOURCES LIST (ENGLISH)
12/19/2023   [facility]  N/A  For questions about this resource list or additional care needs, please contact your primary care clinic or care manager.  Phone: [phone]   Email: N/A   Address: [address] [place] [address]   Hours: N/A        Hotlines and Helplines       Hotline - Housing crisis  1  [facility] Distance: 5.25 miles      Phone/Virtual   [address] [place] [address]  Language: English  Hours: Mon - Sun Open 24 Hours   Phone: (725) 965-9212 Email: communications@[facility].org Website: https://[facility].org/oursaviourshousing/     2  [facility] (Main Office) Distance: 5.96 miles      Phone/Virtual   [address] [place] [address]  Language: English  Hours: Mon - Sun Open 24 Hours   Phone: (591) 751-9986 Email: info@[facility].org Website: http://[employer] [facility].org          Housing       Coordinated Entry access point  3  [facility] - Coordinated Access to Housing and Shelter ([facility]) - Coordinated Access - Coordinated Entry access point Distance: 2.7 miles      In-Person, Phone/Virtual   [address] [place] [address]  Language: English  Hours: Mon - Fri 8:00 AM - 4:30 PM  Fees: Free   Phone: (835) 269-5742 Website: https://www.[facility]./residents/assistance-support/assistance/housing-services-support     4  [facility] Distance: 4.01 miles      In-Person, Phone/Virtual   [address] [place] [address]  Language: English  Hours: Mon - Fri 8:30 AM - 4:30 PM  Fees: Free   Phone: (678) 416-6633 Email: info@mncare.org Website: https://www.[facility].org/locations/[facility]/     Drop-in center or day shelter  5  [facility] Distance: 5.53 miles      In-Person   [address] [place] [address]  Language: English, American, Lithuanian  Hours: Mon - Sat 7:00 AM - 3:00 PM  Fees: Free, Self  Pay   Phone: (490) 116-8945 Email: info@[employer] Website: https://www.[employer].org/locations/opportunity-center/     6  [facility] Distance: 5.58 miles      In-Person   [address] [place] [address]  Language: English  Hours: Mon - Fri 9:00 AM - 4:00 PM  Fees: Free   Phone: (491) 869-1278 Email: frontbeccak@[employer] Website: http://[employer]     Housing search assistance  7  [facility] - Online Housing Search Assistance Distance: 1.39 miles      Phone/Virtual   [address] [place] [address]  Language: English  Hours: Mon - Sun Open 24 Hours  Fees: Free   Phone: (869) 578-8522 Email: lavonne@[employer] Website: https://[employer]/     8  [facility] [place] - Mental Health Crisis Housing Search Assistance Distance: 2.74 miles      In-Person, Phone/Virtual   [address] [facility] [address] [place] [address]  Language: English  Hours: Mon - Tue 8:00 AM - 4:30 PM , Wed 8:00 AM - 6:00 PM , Thu - Fri 8:00 AM - 4:30 PM  Fees: Free   Phone: (232) 146-1758 Email: [facility]. Website: https://www.[facility]./residents/health-medical/clinics-services/mental-health/adult-mental-health     Shelter for families  9  [facility] Distance: 18.73 miles      In-Person   [address] [place] [address]  Language: English  Hours: Mon - Fri 3:00 PM - 9:00 AM , Sat - Sun Open 24 Hours  Fees: Free   Phone: (705) 507-5808 Ext.1 Website: https://www.[employer].org/2020/07/03/emergency-family-shelter/     Shelter for individuals  10  [facility] - Coordinated Access to Housing and Shelter ([facility]) - Coordinated Access - Emergency housing Distance: 2.7 miles      In-Person, Phone/Virtual   [address] [place] [address]  Language: English  Hours: Mon - Fri 8:00 AM - 4:30 PM  Fees: Free   Phone: (275) 756-6209 Website:  https://www.[facility]./residents/assistance-support/assistance/housing-services-support     11  [facility] - [facility] [place] [facility] - [facility] [place] [facility] Distance: 3.97 miles      In-Person   [address] [place] [address]  Language: English  Hours: Mon - Sun 5:00 PM - 10:00 AM  Fees: Free, Self Pay   Phone: (807) 601-8368 Email: info@[place] [employer] [place] Website: https://www.[place]/locations/[facility]/          Important Numbers & Websites       Emergency Services   911  [facility] Services   311  Poison Control   (403) 127-1887  Suicide Prevention Lifeline   (199) 957-4075 (TALK)  Child Abuse Hotline   (237) 835-1692 (4-A-Child)  Sexual Assault Hotline   (612) 984-4247 (HOPE)  National Runaway Safeline   (530) 539-1746 (RUNAWAY)  All-Options Talkline   (364) 146-4347  Substance Abuse Referral   (325) 446-8049 (HELP)

## 2023-12-20 ENCOUNTER — OFFICE VISIT (OUTPATIENT)
Dept: PEDIATRICS | Facility: CLINIC | Age: 68
End: 2023-12-20
Payer: COMMERCIAL

## 2023-12-20 VITALS
SYSTOLIC BLOOD PRESSURE: 152 MMHG | HEIGHT: 65 IN | WEIGHT: 182.9 LBS | RESPIRATION RATE: 20 BRPM | TEMPERATURE: 97.1 F | HEART RATE: 62 BPM | BODY MASS INDEX: 30.47 KG/M2 | OXYGEN SATURATION: 96 % | DIASTOLIC BLOOD PRESSURE: 90 MMHG

## 2023-12-20 DIAGNOSIS — E78.5 HYPERLIPIDEMIA WITH TARGET LDL LESS THAN 130: ICD-10-CM

## 2023-12-20 DIAGNOSIS — Z00.00 ENCOUNTER FOR MEDICARE ANNUAL WELLNESS EXAM: Primary | ICD-10-CM

## 2023-12-20 DIAGNOSIS — M67.431 GANGLION CYST OF WRIST, RIGHT: ICD-10-CM

## 2023-12-20 DIAGNOSIS — Z12.5 SCREENING FOR PROSTATE CANCER: ICD-10-CM

## 2023-12-20 DIAGNOSIS — I10 ESSENTIAL HYPERTENSION: ICD-10-CM

## 2023-12-20 DIAGNOSIS — N52.9 ERECTILE DYSFUNCTION, UNSPECIFIED ERECTILE DYSFUNCTION TYPE: ICD-10-CM

## 2023-12-20 LAB
ALBUMIN SERPL BCG-MCNC: 4.4 G/DL (ref 3.5–5.2)
ALP SERPL-CCNC: 77 U/L (ref 40–150)
ALT SERPL W P-5'-P-CCNC: 21 U/L (ref 0–70)
ANION GAP SERPL CALCULATED.3IONS-SCNC: 10 MMOL/L (ref 7–15)
AST SERPL W P-5'-P-CCNC: 25 U/L (ref 0–45)
BILIRUB SERPL-MCNC: 0.5 MG/DL
BUN SERPL-MCNC: 18.7 MG/DL (ref 8–23)
CALCIUM SERPL-MCNC: 9.1 MG/DL (ref 8.8–10.2)
CHLORIDE SERPL-SCNC: 106 MMOL/L (ref 98–107)
CHOLEST SERPL-MCNC: 195 MG/DL
CREAT SERPL-MCNC: 1.01 MG/DL (ref 0.67–1.17)
DEPRECATED HCO3 PLAS-SCNC: 24 MMOL/L (ref 22–29)
EGFRCR SERPLBLD CKD-EPI 2021: 81 ML/MIN/1.73M2
FASTING STATUS PATIENT QL REPORTED: YES
GLUCOSE SERPL-MCNC: 107 MG/DL (ref 70–99)
HDLC SERPL-MCNC: 43 MG/DL
LDLC SERPL CALC-MCNC: 114 MG/DL
NONHDLC SERPL-MCNC: 152 MG/DL
POTASSIUM SERPL-SCNC: 4.2 MMOL/L (ref 3.4–5.3)
PROT SERPL-MCNC: 7.1 G/DL (ref 6.4–8.3)
PSA SERPL DL<=0.01 NG/ML-MCNC: 2.98 NG/ML (ref 0–4.5)
SODIUM SERPL-SCNC: 140 MMOL/L (ref 135–145)
TRIGL SERPL-MCNC: 188 MG/DL

## 2023-12-20 PROCEDURE — 36415 COLL VENOUS BLD VENIPUNCTURE: CPT | Performed by: INTERNAL MEDICINE

## 2023-12-20 PROCEDURE — 80053 COMPREHEN METABOLIC PANEL: CPT | Performed by: INTERNAL MEDICINE

## 2023-12-20 PROCEDURE — 80061 LIPID PANEL: CPT | Performed by: INTERNAL MEDICINE

## 2023-12-20 PROCEDURE — G0439 PPPS, SUBSEQ VISIT: HCPCS | Performed by: INTERNAL MEDICINE

## 2023-12-20 PROCEDURE — G0103 PSA SCREENING: HCPCS | Performed by: INTERNAL MEDICINE

## 2023-12-20 PROCEDURE — 99213 OFFICE O/P EST LOW 20 MIN: CPT | Mod: 25 | Performed by: INTERNAL MEDICINE

## 2023-12-20 RX ORDER — SILDENAFIL 100 MG/1
100 TABLET, FILM COATED ORAL DAILY PRN
Qty: 6 TABLET | Refills: 11 | Status: SHIPPED | OUTPATIENT
Start: 2023-12-20

## 2023-12-20 RX ORDER — LISINOPRIL 10 MG/1
10 TABLET ORAL DAILY
Qty: 90 TABLET | Refills: 3 | Status: SHIPPED | OUTPATIENT
Start: 2023-12-20

## 2023-12-20 RX ORDER — SIMVASTATIN 20 MG
TABLET ORAL
Qty: 90 TABLET | Refills: 4 | Status: SHIPPED | OUTPATIENT
Start: 2023-12-20

## 2023-12-20 ASSESSMENT — ENCOUNTER SYMPTOMS
ABDOMINAL PAIN: 0
NERVOUS/ANXIOUS: 0
PARESTHESIAS: 0
NAUSEA: 0
HEADACHES: 0
DIARRHEA: 0
SORE THROAT: 0
JOINT SWELLING: 0
FEVER: 0
COUGH: 0
DIZZINESS: 0
EYE PAIN: 0
HEMATOCHEZIA: 0
MYALGIAS: 0
SHORTNESS OF BREATH: 0
HEMATURIA: 0
HEARTBURN: 0
CONSTIPATION: 0
PALPITATIONS: 0
WEAKNESS: 0
ARTHRALGIAS: 1
DYSURIA: 0
CHILLS: 0
FREQUENCY: 0

## 2023-12-20 ASSESSMENT — PAIN SCALES - GENERAL: PAINLEVEL: NO PAIN (0)

## 2023-12-20 ASSESSMENT — ACTIVITIES OF DAILY LIVING (ADL): CURRENT_FUNCTION: NO ASSISTANCE NEEDED

## 2023-12-20 NOTE — COMMUNITY RESOURCES LIST (ENGLISH)
12/20/2023   [facility]  N/A  For questions about this resource list or additional care needs, please contact your primary care clinic or care manager.  Phone: [phone]   Email: N/A   Address: [address] [place] [address]   Hours: N/A        Hotlines and Helplines       Hotline - Housing crisis  1  [facility] Distance: 5.25 miles      Phone/Virtual   [address] [place] [address]  Language: English  Hours: Mon - Sun Open 24 Hours   Phone: (352) 205-4120 Email: communications@[facility].org Website: https://[facility].org/oursaviourshousing/     2  [facility] (Main Office) Distance: 5.96 miles      Phone/Virtual   [address] [place] [address]  Language: English  Hours: Mon - Sun Open 24 Hours   Phone: (248) 910-7443 Email: info@[facility].org Website: http://[facility].org          Housing       Coordinated Entry access point  3  [facility] - Coordinated Access to Housing and Shelter ([facility]) - Coordinated Access - Coordinated Entry access point Distance: 2.7 miles      In-Person, Phone/Virtual   [address] [place] [address]  Language: English  Hours: Mon - Fri 8:00 AM - 4:30 PM  Fees: Free   Phone: (440) 484-4579 Website: https://www.[facility]./residents/assistance-support/assistance/housing-services-support     4  [facility] Distance: 4.01 miles      In-Person, Phone/Virtual   [address] [place] [address]  Language: English  Hours: Mon - Fri 8:30 AM - 4:30 PM  Fees: Free   Phone: (571) 276-5935 Email: info@mncare.org Website: https://www.[facility].org/locations/[facility]/     Drop-in center or day shelter  5  [facility] and [place] [facility] Distance: 5.53 miles      In-Person   [address] [place] [address]  Language: English, Latvian, Tamazight  Hours: Mon - Sat 7:00 AM - 3:00 PM  Fees: Free, Self  Pay   Phone: (310) 191-6565 Email: info@[employer] Website: https://www.[employer].org/locations/opportunity-center/     6  [facility] Distance: 5.58 miles      In-Person   [address] [place] [address]  Language: English  Hours: Mon - Fri 9:00 AM - 4:00 PM  Fees: Free   Phone: (946) 160-9488 Email: frontbeccak@[employer] Website: http://[employer]     Housing search assistance  7  [facility] - Online Housing Search Assistance Distance: 1.39 miles      Phone/Virtual   [address] [place] [address]  Language: English  Hours: Mon - Sun Open 24 Hours  Fees: Free   Phone: (488) 537-3878 Email: lavonne@[employer] Website: https://[employer]/     8  [facility] - [facility] Mental Health [place] - Mental Health Crisis Housing Search Assistance Distance: 2.74 miles      In-Person, Phone/Virtual   [address] [facility] [address] [place] [address]  Language: English  Hours: Mon - Tue 8:00 AM - 4:30 PM , Wed 8:00 AM - 6:00 PM , Thu - Fri 8:00 AM - 4:30 PM  Fees: Free   Phone: (145) 353-6639 Email: [facility]. Website: https://www.[facility]./residents/health-medical/clinics-services/mental-health/adult-mental-health     Shelter for families  9  [facility] Distance: 18.73 miles      In-Person   [address] [place] [address]  Language: English  Hours: Mon - Fri 3:00 PM - 9:00 AM , Sat - Sun Open 24 Hours  Fees: Free   Phone: (415) 740-9278 Ext.1 Website: https://www.[facility]On.org/2020/07/03/emergency-family-shelter/     Shelter for individuals  10  [facility] and [facility] - Coordinated Access to Housing and Shelter ([facility]) - Coordinated Access - Emergency housing Distance: 2.7 miles      In-Person, Phone/Virtual   [address] [place] [address]  Language: English  Hours: Mon - Fri 8:00 AM - 4:30 PM  Fees: Free   Phone: (512) 390-4692 Website:  https://www.[facility]./residents/assistance-support/assistance/housing-services-support     11  [facility] [place] [facility] - [facility] [place] [facility] Distance: 3.97 miles      In-Person   [address] [place] [address]  Language: English  Hours: Mon - Sun 5:00 PM - 10:00 AM  Fees: Free, Self Pay   Phone: (667) 396-1294 Email: info@[employer] Website: https://www.[facility] [employer]/locations/[facility]/          Important Numbers & Websites       Emergency Services   911  [facility] Services   311  Poison Control   (429) 258-8329  Suicide Prevention Lifeline   (893) 583-5637 (TALK)  Child Abuse Hotline   (234) 583-8964 (4-A-Child)  Sexual Assault Hotline   (105) 562-7779 (HOPE)  National Runaway Safeline   (982) 175-9297 (RUNAWAY)  All-Options Talkline   (390) 307-1221  Substance Abuse Referral   (764) 927-3074 (HELP)

## 2023-12-20 NOTE — PROGRESS NOTES
"SUBJECTIVE:   Paresh is a 68 year old, presenting for the following:  Physical        12/20/2023    10:07 AM   Additional Questions   Roomed by Berta Nixon       Are you in the first 12 months of your Medicare coverage?  No    Healthy Habits:     In general, how would you rate your overall health?  Good    Frequency of exercise:  4-5 days/week    Duration of exercise:  45-60 minutes    Do you usually eat at least 4 servings of fruit and vegetables a day, include whole grains    & fiber and avoid regularly eating high fat or \"junk\" foods?  Yes    Taking medications regularly:  Yes    Medication side effects:  None    Ability to successfully perform activities of daily living:  No assistance needed    Home Safety:  No safety concerns identified    Hearing Impairment:  Difficulty following a conversation in a noisy restaurant or crowded room    In the past 6 months, have you been bothered by leaking of urine? Yes    In general, how would you rate your overall mental or emotional health?  Good    Additional concerns today:  Yes    Here for AWV    Right wrist cyst over the radial artery area. Has been intermittently bothersome. Diclofenac gel helped. Is potentially interested in referral.     ED sx. Can get erections, but decreased turgor and shorter duration. Interested in trying meds to treat.     Hyperlipidemia. Simvastatin. Working well.       Today's PHQ-2 Score:       12/19/2023     2:47 PM   PHQ-2 ( 1999 Pf[employer])   Q1: Little interest or pleasure in doing things 0   Q2: Feeling down, depressed or hopeless 0   PHQ-2 Score 0   Q1: Little interest or pleasure in doing things Not at all   Q2: Feeling down, depressed or hopeless Not at all   PHQ-2 Score 0         Have you ever done Advance Care Planning? (For example, a Health Directive, POLST, or a discussion with a medical provider or your loved ones about your wishes): No, advance care planning information given to patient to review.  Advanced care planning was " discussed at today's visit.       Fall risk  Fallen 2 or more times in the past year?: No  Any fall with injury in the past year?: No    Cognitive Screening   1) Repeat 3 items (Leader, Season, Table)    2) Clock draw: NORMAL  3) 3 item recall: Recalls 2 objects   Results: NORMAL clock, 1-2 items recalled: COGNITIVE IMPAIRMENT LESS LIKELY    Mini-CogTM Copyright LUPE Rodriguez. Licensed by the author for use in [facility]; reprinted with permission (cherry@[facility]). All rights reserved.      Do you have sleep apnea, excessive snoring or daytime drowsiness? : yes       Social History     Tobacco Use    Smoking status: Former     Packs/day: 0.00     Years: 0.00     Additional pack years: 0.00     Total pack years: 0.00     Types: Cigarettes    Smokeless tobacco: Never    Tobacco comments:     quit in about 1985 after about 30 pack years   Substance Use Topics    Alcohol use: Yes           12/17/2023    10:45 AM   Alcohol Use   Prescreen: >3 drinks/day or >7 drinks/week? No     Do you have a current opioid prescription? No  Do you use any other controlled substances or medications that are not prescribed by a provider? None        Current providers sharing in care for this patient include:   Patient Care Team:  Kelton Lopez MD as PCP - General  Kelton Lopez MD as Assigned PCP    The following health maintenance items are reviewed in Epic and correct as of today:  Health Maintenance   Topic Date Due    ANNUAL REVIEW OF  ORDERS  Never done    LUNG CANCER SCREENING  Never done    RSV VACCINE (Pregnancy & 60+) (1 - 1-dose 60+ series) Never done    Pneumococcal Vaccine: 65+ Years (1 - PCV) Never done    DTAP/TDAP/TD IMMUNIZATION (2 - Td or Tdap) 08/31/2020    INFLUENZA VACCINE (1) 09/01/2023    COVID-19 Vaccine (6 - 2023-24 season) 09/01/2023    MEDICARE ANNUAL WELLNESS VISIT  12/19/2023    FALL RISK ASSESSMENT  12/20/2024    COLORECTAL CANCER SCREENING  02/01/2026    LIPID  12/19/2027    ADVANCE CARE PLANNING   "12/21/2027    HEPATITIS C SCREENING  Completed    PHQ-2 (once per calendar year)  Completed    ZOSTER IMMUNIZATION  Completed    AORTIC ANEURYSM SCREENING (SYSTEM ASSIGNED)  Completed    IPV IMMUNIZATION  Aged Out    HPV IMMUNIZATION  Aged Out    MENINGITIS IMMUNIZATION  Aged Out    RSV MONOCLONAL ANTIBODY  Aged Out    HEPATITIS A IMMUNIZATION  Discontinued    HEPATITIS B IMMUNIZATION  Discontinued             Review of Systems   Constitutional:  Negative for chills and fever.   HENT:  Negative for congestion, ear pain, hearing loss and sore throat.    Eyes:  Negative for pain and visual disturbance.   Respiratory:  Negative for cough and shortness of breath.    Cardiovascular:  Negative for chest pain, palpitations and peripheral edema.   Gastrointestinal:  Negative for abdominal pain, constipation, diarrhea, heartburn, hematochezia and nausea.   Genitourinary:  Positive for impotence. Negative for dysuria, frequency, genital sores, hematuria, penile discharge and urgency.   Musculoskeletal:  Positive for arthralgias. Negative for joint swelling and myalgias.   Skin:  Negative for rash.   Neurological:  Negative for dizziness, weakness, headaches and paresthesias.   Psychiatric/Behavioral:  Negative for mood changes. The patient is not nervous/anxious.        OBJECTIVE:   BP (!) 152/90   Pulse 62   Temp 97.1  F (36.2  C) (Tympanic)   Resp 20   Ht 1.663 m (5' 5.47\")   Wt 83 kg (182 lb 14.4 oz)   SpO2 96%   BMI 30.00 kg/m   Estimated body mass index is 30 kg/m  as calculated from the following:    Height as of this encounter: 1.663 m (5' 5.47\").    Weight as of this encounter: 83 kg (182 lb 14.4 oz).  Physical Exam  GENERAL: healthy, alert and no distress  EYES: PERRL, EOMI  HENT: ear canals and TM's normal. No nasal discharge. OP moist.  NECK: no adenopathy  RESP: lungs clear to auscultation - no rales, rhonchi or wheezes  CV: regular rate and rhythm, normal S1 S2, no murmur, no peripheral edema and " peripheral pulses strong  ABDOMEN: soft, nontender, bowel sounds normal  MS: cyst noted over the right wrist overlying the radial artery area  SKIN: no suspicious lesions or rashes  NEURO: Normal strength and tone  PSYCH: mentation appears normal, affect normal/bright          ASSESSMENT / PLAN:       ICD-10-CM    1. Encounter for Medicare annual wellness exam  Z00.00 Lipid panel reflex to direct LDL Fasting     Comprehensive metabolic panel     Lipid panel reflex to direct LDL Fasting     Comprehensive metabolic panel      2. Hyperlipidemia with target LDL less than 130  E78.5 simvastatin (ZOCOR) 20 MG tablet     Lipid panel reflex to direct LDL Fasting     Comprehensive metabolic panel     Lipid panel reflex to direct LDL Fasting     Comprehensive metabolic panel      3. Ganglion cyst of wrist, right  M67.431 Orthopedic  Referral      4. Erectile dysfunction, unspecified erectile dysfunction type  N52.9 sildenafil (VIAGRA) 100 MG tablet      5. Screening for prostate cancer  Z12.5 Prostate Specific Antigen Screen     Prostate Specific Antigen Screen      6. Essential hypertension  I10 lisinopril (ZESTRIL) 10 MG tablet         Here for AWV. Overall he is feeling well.   Renewed simvastatin. Check fasting lipids today.  Ganglion cyst - orthopedic referral entered  ED - reviewed treatment options  HTN - has not been treated for BP in the past. Reviewed treatment options    Patient Instructions   For blood pressure, start Lisinopril 10 mg once per day   Optimal BP readings: <130 / <85   Too high: >140 / >90   Very high: >180 / >105    Plan to have your BP rechecked in about 1 month either with a nurse visit or a pharmacy visit      For ED, trial of Viagra (sildenafil) 1/4 - 1 pill per dose as needed        COUNSELING:  Reviewed preventive health counseling, as reflected in patient instructions      He reports that he has quit smoking. His smoking use included cigarettes. He has never used smokeless  tobacco.      Appropriate preventive services were discussed with this patient, including applicable screening as appropriate for fall prevention, nutrition, physical activity, Tobacco-use cessation, weight loss and cognition.  Checklist reviewing preventive services available has been given to the patient.    Reviewed patients plan of care and provided an AVS. The Basic Care Plan (routine screening as documented in Health Maintenance) for Guillermo meets the Care Plan requirement. This Care Plan has been established and reviewed with the Patient.      Kelton Lopez MD  [facility]

## 2023-12-20 NOTE — PATIENT INSTRUCTIONS
For blood pressure, start Lisinopril 10 mg once per day   Optimal BP readings: <130 / <85   Too high: >140 / >90   Very high: >180 / >105    Plan to have your BP rechecked in about 1 month either with a nurse visit or a pharmacy visit      For ED, trial of Viagra (sildenafil) 1/4 - 1 pill per dose as needed        Patient Education   Personalized Prevention Plan  You are due for the preventive services outlined below.  Your care team is available to assist you in scheduling these services.  If you have already completed any of these items, please share that information with your care team to update in your medical record.  Health Maintenance Due   Topic Date Due    LUNG CANCER SCREENING  Never done     Hearing Loss: Care Instructions  Overview     Hearing loss is a sudden or slow decrease in how well you hear. It can range from slight to profound. Permanent hearing loss can occur with aging. It also can happen when you are exposed long-term to loud noise. Examples include listening to loud music, riding motorcycles, or being around other loud machines.  Hearing loss can affect your work and home life. It can make you feel lonely or depressed. You may feel that you have lost your independence. But hearing aids and other devices can help you hear better and feel connected to others.  Follow-up care is a key part of your treatment and safety. Be sure to make and go to all appointments, and call your doctor if you are having problems. It's also a good idea to know your test results and keep a list of the medicines you take.  How can you care for yourself at home?  Avoid loud noises whenever possible. This helps keep your hearing from getting worse.  Always wear hearing protection around loud noises.  Wear a hearing aid as directed.  A professional can help you pick a hearing aid that will work best for you.  You can also get hearing aids over the counter for mild to moderate hearing loss.  Have hearing tests as your  "doctor suggests. They can show whether your hearing has changed. Your hearing aid may need to be adjusted.  Use other devices as needed. These may include:  Telephone amplifiers and hearing aids that can connect to a television, stereo, radio, or microphone.  Devices that use lights or vibrations. These alert you to the doorbell, a ringing telephone, or a baby monitor.  Television closed-captioning. This shows the words at the bottom of the screen. Most new TVs can do this.  TTY (text telephone). This lets you type messages back and forth on the telephone instead of talking or listening. These devices are also called TDD. When messages are typed on the keyboard, they are sent over the phone line to a receiving TTY. The message is shown on a monitor.  Use text messaging, social media, and email if it is hard for you to communicate by telephone.  Try to learn a listening technique called speechreading. It is not lipreading. You pay attention to people's gestures, expressions, posture, and tone of voice. These clues can help you understand what a person is saying. Face the person you are talking to, and have them face you. Make sure the lighting is good. You need to see the other person's face clearly.  Think about counseling if you need help to adjust to your hearing loss.  When should you call for help?  Watch closely for changes in your health, and be sure to contact your doctor if:    You think your hearing is getting worse.     You have new symptoms, such as dizziness or nausea.   Where can you learn more?  Go to https://www.[employer].net/patiented  Enter R798 in the search box to learn more about \"Hearing Loss: Care Instructions.\"  Current as of: February 28, 2023               Content Version: 13.8    8156-6846 [employer].   Care instructions adapted under license by your healthcare professional. If you have questions about a medical condition or this instruction, always ask your healthcare " professional. [employer] disclaims any warranty or liability for your use of this information.      Bladder Training: Care Instructions  Your Care Instructions     Bladder training is used to treat urge incontinence and stress incontinence. Urge incontinence means that the need to urinate comes on so fast that you can't get to a toilet in time. Stress incontinence means that you leak urine because of pressure on your bladder. For example, it may happen when you laugh, cough, or lift something heavy.  Bladder training can increase how long you can wait before you have to urinate. It can also help your bladder hold more urine. And it can give you better control over the urge to urinate.  It is important to remember that bladder training takes a few weeks to a few months to make a difference. You may not see results right away, but don't give up.  Follow-up care is a key part of your treatment and safety. Be sure to make and go to all appointments, and call your doctor if you are having problems. It's also a good idea to know your test results and keep a list of the medicines you take.  How can you care for yourself at home?  Work with your doctor to come up with a bladder training program that is right for you. You may use one or more of the following methods.  Delayed urination  In the beginning, try to keep from urinating for 5 minutes after you first feel the need to go.  While you wait, take deep, slow breaths to relax. Kegel exercises can also help you delay the need to go to the bathroom.  After some practice, when you can easily wait 5 minutes to urinate, try to wait 10 minutes before you urinate.  Slowly increase the waiting period until you are able to control when you have to urinate.  Scheduled urination  Empty your bladder when you first wake up in the morning.  Schedule times throughout the day when you will urinate.  Start by going to the bathroom every hour, even if you don't need to  "go.  Slowly increase the time between trips to the bathroom.  When you have found a schedule that works well for you, keep doing it.  If you wake up during the night and have to urinate, do it. Apply your schedule to waking hours only.  Kegel exercises  These tighten and strengthen pelvic muscles, which can help you control the flow of urine. (If doing these exercises causes pain, stop doing them and talk with your doctor.) To do Kegel exercises:  Squeeze your muscles as if you were trying not to pass gas. Or squeeze your muscles as if you were stopping the flow of urine. Your belly, legs, and buttocks shouldn't move.  Hold the squeeze for 3 seconds, then relax for 5 to 10 seconds.  Start with 3 seconds, then add 1 second each week until you are able to squeeze for 10 seconds.  Repeat the exercise 10 times a session. Do 3 to 8 sessions a day.  When should you call for help?  Watch closely for changes in your health, and be sure to contact your doctor if:    Your incontinence is getting worse.     You do not get better as expected.   Where can you learn more?  Go to https://www.[employer].net/patiented  Enter V684 in the search box to learn more about \"Bladder Training: Care Instructions.\"  Current as of: February 28, 2023               Content Version: 13.8    7995-2077 [employer].   Care instructions adapted under license by your healthcare professional. If you have questions about a medical condition or this instruction, always ask your healthcare professional. [employer] disclaims any warranty or liability for your use of this information.         "

## 2023-12-29 ENCOUNTER — TRANSFERRED RECORDS (OUTPATIENT)
Dept: HEALTH INFORMATION MANAGEMENT | Facility: CLINIC | Age: 68
End: 2023-12-29
Payer: COMMERCIAL

## 2024-03-06 DIAGNOSIS — E78.5 HYPERLIPIDEMIA WITH TARGET LDL LESS THAN 130: ICD-10-CM

## 2024-03-06 RX ORDER — SIMVASTATIN 20 MG
TABLET ORAL
Qty: 90 TABLET | Refills: 4 | OUTPATIENT
Start: 2024-03-06

## 2024-03-06 NOTE — TELEPHONE ENCOUNTER
[facility] requesting refill      Drug Simvastatin 20mg     Sig take 1 tablet by mouth at bedtime    Ester Dover on 3/6/2024 at 1:14 PM

## 2024-03-29 ENCOUNTER — TRANSFERRED RECORDS (OUTPATIENT)
Dept: HEALTH INFORMATION MANAGEMENT | Facility: CLINIC | Age: 69
End: 2024-03-29
Payer: COMMERCIAL

## 2024-10-11 ENCOUNTER — TRANSFERRED RECORDS (OUTPATIENT)
Dept: HEALTH INFORMATION MANAGEMENT | Facility: CLINIC | Age: 69
End: 2024-10-11
Payer: COMMERCIAL

## 2024-11-20 ENCOUNTER — PATIENT OUTREACH (OUTPATIENT)
Dept: CARE COORDINATION | Facility: CLINIC | Age: 69
End: 2024-11-20
Payer: COMMERCIAL

## 2024-12-04 ENCOUNTER — PATIENT OUTREACH (OUTPATIENT)
Dept: CARE COORDINATION | Facility: CLINIC | Age: 69
End: 2024-12-04
Payer: COMMERCIAL

## 2024-12-09 DIAGNOSIS — I10 ESSENTIAL HYPERTENSION: ICD-10-CM

## 2024-12-09 RX ORDER — LISINOPRIL 10 MG/1
10 TABLET ORAL DAILY
Qty: 90 TABLET | Refills: 0 | Status: SHIPPED | OUTPATIENT
Start: 2024-12-09

## 2024-12-16 DIAGNOSIS — I10 ESSENTIAL HYPERTENSION: ICD-10-CM

## 2024-12-16 RX ORDER — LISINOPRIL 10 MG/1
TABLET ORAL
Qty: 90 TABLET | Refills: 0 | OUTPATIENT
Start: 2024-12-16

## 2024-12-18 ENCOUNTER — OFFICE VISIT (OUTPATIENT)
Dept: URGENT CARE | Facility: URGENT CARE | Age: 69
End: 2024-12-18
Payer: COMMERCIAL

## 2024-12-18 VITALS
TEMPERATURE: 97 F | BODY MASS INDEX: 28.12 KG/M2 | RESPIRATION RATE: 15 BRPM | HEART RATE: 77 BPM | WEIGHT: 175 LBS | HEIGHT: 66 IN | DIASTOLIC BLOOD PRESSURE: 64 MMHG | SYSTOLIC BLOOD PRESSURE: 132 MMHG | OXYGEN SATURATION: 98 %

## 2024-12-18 DIAGNOSIS — N40.2 PROSTATE NODULE: ICD-10-CM

## 2024-12-18 DIAGNOSIS — N13.9 LOWER URINARY OBSTRUCTIVE SYMPTOM: Primary | ICD-10-CM

## 2024-12-18 DIAGNOSIS — R10.31 RLQ ABDOMINAL PAIN: ICD-10-CM

## 2024-12-18 DIAGNOSIS — R30.0 DYSURIA: ICD-10-CM

## 2024-12-18 DIAGNOSIS — Z80.42 FAMILY HISTORY OF PROSTATE CANCER: ICD-10-CM

## 2024-12-18 LAB
ALBUMIN UR-MCNC: NEGATIVE MG/DL
APPEARANCE UR: CLEAR
BILIRUB UR QL STRIP: NEGATIVE
COLOR UR AUTO: YELLOW
GLUCOSE UR STRIP-MCNC: NEGATIVE MG/DL
HGB UR QL STRIP: NEGATIVE
KETONES UR STRIP-MCNC: NEGATIVE MG/DL
LEUKOCYTE ESTERASE UR QL STRIP: NEGATIVE
NITRATE UR QL: NEGATIVE
PH UR STRIP: 5.5 [PH] (ref 5–7)
SP GR UR STRIP: 1.02 (ref 1–1.03)
UROBILINOGEN UR STRIP-ACNC: 0.2 E.U./DL

## 2024-12-18 PROCEDURE — 81003 URINALYSIS AUTO W/O SCOPE: CPT

## 2024-12-18 PROCEDURE — 99215 OFFICE O/P EST HI 40 MIN: CPT | Performed by: INTERNAL MEDICINE

## 2024-12-18 RX ORDER — TAMSULOSIN HYDROCHLORIDE 0.4 MG/1
0.4 CAPSULE ORAL DAILY
Qty: 30 CAPSULE | Refills: 0 | Status: SHIPPED | OUTPATIENT
Start: 2024-12-18 | End: 2025-01-17

## 2024-12-18 ASSESSMENT — ENCOUNTER SYMPTOMS
DYSURIA: 0
HEMATURIA: 0

## 2024-12-18 NOTE — PROGRESS NOTES
ASSESSMENT AND PLAN:      ICD-10-CM    1. Lower urinary obstructive symptom  N13.9 Adult Urology  Referral     tamsulosin (FLOMAX) 0.4 MG capsule      2. Dysuria  R30.0 UA Macroscopic with reflex to Microscopic and Culture - Clinic Collect     Adult Urology  Referral      3. Prostate nodule  N40.2 Adult Urology  Referral      4. Family history of prostate cancer  Z80.42 Adult Urology  Referral      5. RLQ abdominal pain  R10.31 Referral to Acute and Diagnostic Services (Day of diagnostic / First order acute)          Differential Diagnosis: Patient has right lower quadrant pain with intact appendix.  History of periumbilical hernial mesh placed but doubt stitches would go down to the right lower quadrant and area of pain.  He currently has lower urinary tract symptoms.  Potentially complication as right lower quadrant pain.  No suprapubic discomfort at this time.  Referral to [facility] for advanced imaging to look for complications of appendicitis, he does have a prostate nodule and look for any complications of the prostate.  No tenderness on prostate to suggest prostatitis but would evaluate for this at the same time.  Also look for obstructive urinary tract symptom complications    Discussed with Dr. Méndez at acute diagnostic services -this is at capacity today and also late in the day.  Graciously able to place on schedule for 9 AM tomorrow morning    Patient instructed if symptoms worsen he needs to go to the ER.    Also discussed if he had not had right lower quadrant pain would just initiate on Flomax with urology referral for his obstructive urinary symptoms in addition to the prostate nodule.  With family history of cancer      PLAN:      Patient Instructions       You have prostate urinary symptoms with prostate nodule palpated on exam and right lower quadrant groin pain without obvious hernia    You have been referred to acute diagnostic center for advanced imaging to look  for complications of possible appendicitis or prostate causing blockage to your bladder system and so forth.    Appointment was made for 9 AM tomorrow morning.    If symptoms worsen in the meantime you are directed to go to the ER.    If you did not have this right lower quadrant pain, I would just place you on Flomax prostate pill daily with priority referral to urology.    Both of those orders have been placed    This chart will be sent to your primary  I would like you to see urology and your primary in follow-up.    No follow-ups on file.    Referral to Acute and Diagnostic Services    [phone] ([place]) [place] [address] [facility], [address], [place] [address]    Transition to Acute & Diagnostic Services Clinic has been discussed with patient, and he agrees with next level of care.   Patient understands that evaluation/treatment at [facility] typically takes significantly longer than in clinic/urgent care (>2 hours).  [facility] has been contacted by provider/staff to confirm patient acceptance.     None          The following provider has assessed this patient for intervention at [facility], and directed the patient for referral: Dona Orr MD        40 minutes spent by me on the date of the encounter doing chart review, history and exam, documentation and further activities per the note    Dona Orr MD  [facility] URGENT CARE    Subjective     Guillermo Strauss is a 69 year old who presents for Patient presents with:  Urgent Care: Pain in right lower quadrant near groin since Friday. Hurts to push on in. Urinary urgency,  and then has to go again 5 min later.     an established patient of [facility].    UTI  Here today concerned about UTI for 6 days  Symptoms include urinary urgency.  Needing to push with pressure to urinate and urinary frequency with incomplete bladder emptying.  Needs to go immediately  Has had a right lower quadrant pain and  "near groin  Worse with sitting or first get up from sitting    Treatment and measures tried None  Predisposing factors include  - denies history of  UTI's, kidney stones, and history of prostatitis or enlarged prostate  Fhx prostate problems - cancer   Has PSA tests  Patient denies flank pain, temperature > 101 degrees F., and vomiting      Has appendix    Review of Systems   Genitourinary:  Negative for dysuria and hematuria.     PSA levels annually have been normal       Objective    /64   Pulse 77   Temp 97  F (36.1  C) (Temporal)   Resp 15   Ht 1.676 m (5' 6\")   Wt 79.4 kg (175 lb)   SpO2 98%   BMI 28.25 kg/m    Physical Exam  Vitals reviewed.   Constitutional:       Appearance: Normal appearance. He is not ill-appearing.   Cardiovascular:      Rate and Rhythm: Normal rate and regular rhythm.      Pulses: Normal pulses.      Heart sounds: Normal heart sounds.   Pulmonary:      Effort: Pulmonary effort is normal.      Breath sounds: Normal breath sounds.   Abdominal:      General: Bowel sounds are normal.      Palpations: Abdomen is soft.      Tenderness: There is abdominal tenderness (RLQ / groin). There is no right CVA tenderness, left CVA tenderness, guarding or rebound.      Hernia: No hernia (No direct or indirect hernia palpated) is present.   Genitourinary:     Comments: right mid lobe prostate medial area mass palpated  nontender   Neurological:      Mental Status: He is alert.            Results for orders placed or performed in visit on 12/18/24 (from the past 24 hours)   UA Macroscopic with reflex to Microscopic and Culture - Clinic Collect    Specimen: Urine, Midstream   Result Value Ref Range    Color Urine Yellow Colorless, Straw, Light Yellow, Yellow    Appearance Urine Clear Clear    Glucose Urine Negative Negative mg/dL    Bilirubin Urine Negative Negative    Ketones Urine Negative Negative mg/dL    Specific Gravity Urine 1.020 1.003 - 1.035    Blood Urine Negative Negative    pH " Urine 5.5 5.0 - 7.0    Protein Albumin Urine Negative Negative mg/dL    Urobilinogen Urine 0.2 0.2, 1.0 E.U./dL    Nitrite Urine Negative Negative    Leukocyte Esterase Urine Negative Negative    Narrative    Microscopic not indicated

## 2024-12-18 NOTE — PATIENT INSTRUCTIONS
You have prostate urinary symptoms with prostate nodule palpated on exam and right lower quadrant groin pain without obvious hernia    You have been referred to acute diagnostic center for advanced imaging to look for complications of possible appendicitis or prostate causing blockage to your bladder system and so forth.    Appointment was made for 9 AM tomorrow morning.    If symptoms worsen in the meantime you are directed to go to the ER.    If you did not have this right lower quadrant pain, I would just place you on Flomax prostate pill daily with priority referral to urology.    Both of those orders have been placed    This chart will be sent to your primary  I would like you to see urology and your primary in follow-up.

## 2024-12-19 ENCOUNTER — ANCILLARY PROCEDURE (OUTPATIENT)
Dept: CT IMAGING | Facility: CLINIC | Age: 69
End: 2024-12-19
Attending: PHYSICIAN ASSISTANT
Payer: COMMERCIAL

## 2024-12-19 ENCOUNTER — OFFICE VISIT (OUTPATIENT)
Dept: PEDIATRICS | Facility: CLINIC | Age: 69
End: 2024-12-19
Payer: COMMERCIAL

## 2024-12-19 VITALS
TEMPERATURE: 97.1 F | DIASTOLIC BLOOD PRESSURE: 72 MMHG | HEIGHT: 66 IN | OXYGEN SATURATION: 97 % | WEIGHT: 171 LBS | SYSTOLIC BLOOD PRESSURE: 125 MMHG | HEART RATE: 62 BPM | BODY MASS INDEX: 27.48 KG/M2 | RESPIRATION RATE: 20 BRPM

## 2024-12-19 DIAGNOSIS — R39.9 LOWER URINARY TRACT SYMPTOMS (LUTS): Primary | ICD-10-CM

## 2024-12-19 DIAGNOSIS — R10.31 RLQ ABDOMINAL PAIN: ICD-10-CM

## 2024-12-19 DIAGNOSIS — N40.2 PROSTATE NODULE: ICD-10-CM

## 2024-12-19 DIAGNOSIS — R33.9 URINARY RETENTION: ICD-10-CM

## 2024-12-19 DIAGNOSIS — N40.0 ENLARGED PROSTATE: ICD-10-CM

## 2024-12-19 DIAGNOSIS — Z12.5 ENCOUNTER FOR SCREENING FOR MALIGNANT NEOPLASM OF PROSTATE: ICD-10-CM

## 2024-12-19 LAB
ANION GAP SERPL CALCULATED.3IONS-SCNC: 11 MMOL/L (ref 7–15)
BASOPHILS # BLD AUTO: 0 10E3/UL (ref 0–0.2)
BASOPHILS NFR BLD AUTO: 1 %
BUN SERPL-MCNC: 17 MG/DL (ref 8–23)
CALCIUM SERPL-MCNC: 9 MG/DL (ref 8.8–10.4)
CHLORIDE SERPL-SCNC: 107 MMOL/L (ref 98–107)
CREAT BLD-MCNC: 1.2 MG/DL (ref 0.7–1.3)
CREAT SERPL-MCNC: 0.96 MG/DL (ref 0.67–1.17)
EGFRCR SERPLBLD CKD-EPI 2021: 85 ML/MIN/1.73M2
EGFRCR SERPLBLD CKD-EPI 2021: >60 ML/MIN/1.73M2
EOSINOPHIL # BLD AUTO: 0.1 10E3/UL (ref 0–0.7)
EOSINOPHIL NFR BLD AUTO: 2 %
ERYTHROCYTE [DISTWIDTH] IN BLOOD BY AUTOMATED COUNT: 13.1 % (ref 10–15)
GLUCOSE SERPL-MCNC: 105 MG/DL (ref 70–99)
HCO3 SERPL-SCNC: 24 MMOL/L (ref 22–29)
HCT VFR BLD AUTO: 42.8 % (ref 40–53)
HGB BLD-MCNC: 13.8 G/DL (ref 13.3–17.7)
IMM GRANULOCYTES # BLD: 0 10E3/UL
IMM GRANULOCYTES NFR BLD: 0 %
LYMPHOCYTES # BLD AUTO: 1.3 10E3/UL (ref 0.8–5.3)
LYMPHOCYTES NFR BLD AUTO: 25 %
MCH RBC QN AUTO: 29 PG (ref 26.5–33)
MCHC RBC AUTO-ENTMCNC: 32.2 G/DL (ref 31.5–36.5)
MCV RBC AUTO: 90 FL (ref 78–100)
MONOCYTES # BLD AUTO: 0.4 10E3/UL (ref 0–1.3)
MONOCYTES NFR BLD AUTO: 8 %
NEUTROPHILS # BLD AUTO: 3.4 10E3/UL (ref 1.6–8.3)
NEUTROPHILS NFR BLD AUTO: 64 %
PLATELET # BLD AUTO: 229 10E3/UL (ref 150–450)
POTASSIUM SERPL-SCNC: 4.4 MMOL/L (ref 3.4–5.3)
PSA SERPL DL<=0.01 NG/ML-MCNC: 3.71 NG/ML (ref 0–6.5)
RBC # BLD AUTO: 4.76 10E6/UL (ref 4.4–5.9)
SODIUM SERPL-SCNC: 142 MMOL/L (ref 135–145)
WBC # BLD AUTO: 5.3 10E3/UL (ref 4–11)

## 2024-12-19 PROCEDURE — 250N000009 HC RX 250: Performed by: PHYSICIAN ASSISTANT

## 2024-12-19 PROCEDURE — 250N000011 HC RX IP 250 OP 636: Performed by: PHYSICIAN ASSISTANT

## 2024-12-19 PROCEDURE — 74177 CT ABD & PELVIS W/CONTRAST: CPT

## 2024-12-19 PROCEDURE — 82565 ASSAY OF CREATININE: CPT

## 2024-12-19 RX ORDER — IOPAMIDOL 755 MG/ML
84 INJECTION, SOLUTION INTRAVASCULAR ONCE
Status: COMPLETED | OUTPATIENT
Start: 2024-12-19 | End: 2024-12-19

## 2024-12-19 RX ADMIN — IOPAMIDOL 84 ML: 755 INJECTION, SOLUTION INTRAVENOUS at 10:16

## 2024-12-19 RX ADMIN — SODIUM CHLORIDE 40 ML: 9 INJECTION, SOLUTION INTRAVENOUS at 10:16

## 2024-12-19 ASSESSMENT — PAIN SCALES - GENERAL: PAINLEVEL_OUTOF10: MODERATE PAIN (5)

## 2024-12-19 NOTE — PROGRESS NOTES
Acute and Diagnostic Services Clinic Visit    {PROVIDER CHARTING PREFERENCE:296356}    Sarah Yoder is a 69 year old, presenting for the following health issues:  Abdominal Pain (Patient is here with abdominal pain x 6-7 days.   Patient was seen by UC on 12/18/2024 and possible polyp on prostate.  )  {(!) Visit Details have not yet been documented.  Please enter Visit Details and then use this list to pull in documentation. (Optional):[id number]}  HPI     Abdominal/Flank Pain  Onset/Duration: 6-7 days ago.  Description:   Character: Dull ache  Location: right lower quadrant  Radiation: Back  Intensity: moderate  Progression of Symptoms:  worsening  Accompanying Signs & Symptoms:  Fever/chills: no   Gas/Bloating: no   Nausea: no   Vomitting: no   Diarrhea: no   Constipation:no   Dysuria: no            Hematuria: no            Frequency: YES- more often then usual           Incontinence of urine: no   History:            Last bowel movement: today  Trauma: no   Previous similar pain: no    Previous tests done: none           Previous Abdominal surgery: YES- Hernia repair with a patch.  Precipitating factors:   Does the pain change with:     Food: no      Bowel Movement: no     Urination: no              Other factors: no   Therapies tried and outcome:  None    When food last eaten: 12/18/2024 evening.    Genitourinary - Male  Onset/Duration: 1 week ago  Description:   Dysuria (painful urination): No}  Hematuria (blood in urine): No  Frequency: YES- increased frequency.  Waking at night to urinate: YES  Hesitancy (delay in urine): YES- states has to be patient til stream starts.  Retention (unable to empty): YES  Decrease in urinary flow: YES- urinary flow is less.  Incontinence: No  Progression of Symptoms:  worsening  Accompanying Signs & Symptoms:  Fever: No  Back/Flank pain: YES  Urethral discharge: No  Testicle lumps/masses/pain: No  Nausea and/or vomiting: No  Abdominal pain: YES  History:   History of frequent  "UTI s: No  History of kidney stones: No  History of hernias: YES  Personal or Family history of Prostate problems: YES- father and brother had prostate cancer.  Sexually active: YES  Precipitating or alleviating factors: None  Therapies tried and outcome: none      {SRINATH Picklists (Optional):547156}      Objective    /72 (BP Location: Left arm, Patient Position: Sitting, Cuff Size: Adult Regular)   Pulse 62   Temp 97.1  F (36.2  C) (Temporal)   Resp 20   Ht 1.676 m (5' 6\")   Wt 77.6 kg (171 lb)   SpO2 97%   BMI 27.60 kg/m    Body mass index is 27.6 kg/m .  Physical Exam   {Exam List (Optional):740733}    {Diagnostic Test Results (Optional):344283}        Signed Electronically by: [facility] PROVIDER  {Email feedback regarding this note to [email or web address]   :[id number]}  "

## 2024-12-19 NOTE — Clinical Note
Likely urinary retention due to BPH for Paresh, we weren't able to get a catheter in unfortunately so he will follow up with urology.

## 2024-12-19 NOTE — PROGRESS NOTES
Assessment/Plan:    Patient had 388 mL on bladder scan before and after urination, suspect mild urinary retention.   CBC, BMP unremarkable.   CT abd/pelvis shows enlarged prostate, no acute findings. No mention of prostate mass but discussed MRI of the prostate is better at evaluating prostate nodules if there is concern for malignancy. PSA was ordered and is still in process.   Urinary catheterization was attempted to confirm diagnosis of urinary retention and for symptomatic relief, but unfortunately due to limited supplies & catheter types/sizes in the [facility], RN was not able to accomplish this. Have adjusted patient's urology referral to emergent in hopes of getting him in ASAP for catheterization. Advised to go to ER for worsening pain, if unable to urinate, or if he develops fever. Recommended starting Flomax which was prescribed in urgent care yesterday.      See patient instructions below.    At the end of the encounter, I discussed results, diagnosis, medications. Discussed red flags for immediate return to clinic/ER, as well as indications for follow up if no improvement. Patient understood and agreed to plan. Patient was stable for discharge.    50 minutes was spent preparing for the visit and reviewing the patient's chart; getting a history and performing an exam; counseling and providing education to the patient, family, or caregiver; ordering medicines and/or tests; communicating with other healthcare professionals; documenting information in the medical record; interpreting results and sharing that information with the patient, family, or caregiver; and care coordination.       ICD-10-CM    1. Lower urinary tract symptoms (LUTS)  R39.9 sodium chloride (PF) 0.9% PF flush 3 mL     Basic metabolic panel     CBC with platelets differential     Basic metabolic panel     CBC with platelets differential     Adult Urology  Referral      2. RLQ abdominal pain  R10.31 CT Abdomen Pelvis w Contrast       3. Prostate nodule  N40.2 CT Abdomen Pelvis w Contrast     PSA, screen     PSA, screen     Adult Urology  Referral      4. Encounter for screening for malignant neoplasm of prostate  Z12.5 PSA, screen     PSA, screen      5. Enlarged prostate  N40.0 Adult Urology  Referral      6. Urinary retention  R33.9 Adult Urology  Referral            Return in about 3 days (around 12/22/2024) for follow up with urology.    SABRA Mustafa, ELIUD [facility]  -----------------------------------------------------------------------------------------------------------------------------------------------------    HPI:  Guillermo Strauss is a 69 year old male who presents for evaluation of the following:    Abdominal/Flank Pain  Onset/Duration: 6-7 days ago.  Description:   Character: Dull ache  Location: right lower quadrant  Radiation: right low back at times  Intensity: moderate  Progression of Symptoms:  worsening  Accompanying Signs & Symptoms:  Fever/chills: no   Gas/Bloating: no   Nausea: no   Vomitting: no   Diarrhea: no   Constipation:no   Dysuria: no            Hematuria: no            Frequency: YES- more often than usual           Incontinence of urine: no   History:            Last bowel movement: today  Trauma: no   Previous similar pain: no    Previous tests done:UA in urgent care yesterday, was normal           Previous Abdominal surgery: YES- Hernia repair  Precipitating factors:   Does the pain change with:     Food: no      Bowel Movement: no     Urination: no              Other factors: worse with sitting  Therapies tried and outcome:  None     Has urinary urgency and frequency, as well as hesitancy. Has to push to get urine stream to start, then doesn't go much. No hematuria or dysuria.  No prior hx of BPH or kidney stones.    He was seen in urgent care yesterday and had BETTY, provider felt a nodule on the prostate. Patient has family hx of prostate  "cancer in his father & brother, has PSA checked annually. Was last checked Dec 2023. He was given a Rx for Flomax and priority referral to urology, referred to the [facility] for advanced imaging.    Past Medical History:   Diagnosis Date    Benign essential hypertension     Other and unspecified hyperlipidemia     Preglaucoma, unspecified     follows at [facility]       Vitals:    12/19/24 0854   BP: 125/72   BP Location: Left arm   Patient Position: Sitting   Cuff Size: Adult Regular   Pulse: 62   Resp: 20   Temp: 97.1  F (36.2  C)   TempSrc: Temporal   SpO2: 97%   Weight: 77.6 kg (171 lb)   Height: 1.676 m (5' 6\")       Physical Exam  Vitals and nursing note reviewed.   Pulmonary:      Effort: Pulmonary effort is normal.   Abdominal:      General: There is no distension.      Palpations: Abdomen is soft.      Tenderness: There is abdominal tenderness in the right lower quadrant and suprapubic area. There is no right CVA tenderness, left CVA tenderness, guarding or rebound.   Musculoskeletal:      Lumbar back: No tenderness.   Neurological:      Mental Status: He is alert.         Labs/Imaging:  Results for orders placed or performed in visit on 12/19/24 (from the past 24 hours)   Basic metabolic panel   Result Value Ref Range    Sodium 142 135 - 145 mmol/L    Potassium 4.4 3.4 - 5.3 mmol/L    Chloride 107 98 - 107 mmol/L    Carbon Dioxide (CO2) 24 22 - 29 mmol/L    Anion Gap 11 7 - 15 mmol/L    Urea Nitrogen 17.0 8.0 - 23.0 mg/dL    Creatinine 0.96 0.67 - 1.17 mg/dL    GFR Estimate 85 >60 mL/min/1.73m2    Calcium 9.0 8.8 - 10.4 mg/dL    Glucose 105 (H) 70 - 99 mg/dL   CBC with platelets differential    Narrative    The following orders were created for panel order CBC with platelets differential.  Procedure                               Abnormality         Status                     ---------                               -----------         ------                     CBC with platelets and d...[[id number]]                "       Final result                 Please view results for these tests on the individual orders.   CBC with platelets and differential   Result Value Ref Range    WBC Count 5.3 4.0 - 11.0 10e3/uL    RBC Count 4.76 4.40 - 5.90 10e6/uL    Hemoglobin 13.8 13.3 - 17.7 g/dL    Hematocrit 42.8 40.0 - 53.0 %    MCV 90 78 - 100 fL    MCH 29.0 26.5 - 33.0 pg    MCHC 32.2 31.5 - 36.5 g/dL    RDW 13.1 10.0 - 15.0 %    Platelet Count 229 150 - 450 10e3/uL    % Neutrophils 64 %    % Lymphocytes 25 %    % Monocytes 8 %    % Eosinophils 2 %    % Basophils 1 %    % Immature Granulocytes 0 %    Absolute Neutrophils 3.4 1.6 - 8.3 10e3/uL    Absolute Lymphocytes 1.3 0.8 - 5.3 10e3/uL    Absolute Monocytes 0.4 0.0 - 1.3 10e3/uL    Absolute Eosinophils 0.1 0.0 - 0.7 10e3/uL    Absolute Basophils 0.0 0.0 - 0.2 10e3/uL    Absolute Immature Granulocytes 0.0 <=0.4 10e3/uL     Recent Results (from the past 24 hours)   CT Abdomen Pelvis w Contrast    Narrative    EXAM: CT ABDOMEN PELVIS W CONTRAST  LOCATION: [facility]  DATE: 12/19/2024    INDICATION: RLQ pain, urinary frequency hesitancy incomplete emptying sensation, prostate nodule. Family history of prostate cancer. Eval for appendicitis, urolithiasis, prostate mass  COMPARISON: None.  TECHNIQUE: CT scan of the abdomen and pelvis was performed following injection of IV contrast. Multiplanar reformats were obtained. Dose reduction techniques were used.  CONTRAST: 84mL Isovue 370    FINDINGS:   LOWER CHEST: Minimal scattered subpleural scarring.    HEPATOBILIARY: No focal hepatic mass, biliary dilatation, or calcified gallstones.    PANCREAS: No focal pancreatic mass, or pancreatic inflammation, or pancreatic ductal dilatation.    SPLEEN: Normal size.    ADRENAL GLANDS: No nodules.    KIDNEYS/BLADDER: No suspicious renal mass or hydronephrosis. No urinary bladder wall thickening.    BOWEL: Unremarkable appearance of the visualized portions of the distal esophagus,  small bowel. Normal caliber appendix. No periappendiceal inflammation. Few scattered colonic diverticula are present.    LYMPH NODES: No abdominal or pelvic lymphadenopathy.    VASCULATURE: Normal caliber abdominal aorta. Patent portal, splenic, and mesenteric veins. Patent bilateral renal veins.    PELVIC ORGANS: Enlarged prostate.    MUSCULOSKELETAL: Degenerative changes of the spine. Chronic appearing deformity of the left lesser trochanter and at the left acetabulum.      Impression    IMPRESSION:   1.  No acute findings to explain patient's reported symptoms.           Patient Instructions   Go to ER for worsening pain, fever, or if unable to urinate.   Follow up with urology.

## 2025-01-13 SDOH — HEALTH STABILITY: PHYSICAL HEALTH: ON AVERAGE, HOW MANY DAYS PER WEEK DO YOU ENGAGE IN MODERATE TO STRENUOUS EXERCISE (LIKE A BRISK WALK)?: 7 DAYS

## 2025-01-13 SDOH — HEALTH STABILITY: PHYSICAL HEALTH: ON AVERAGE, HOW MANY MINUTES DO YOU ENGAGE IN EXERCISE AT THIS LEVEL?: 30 MIN

## 2025-01-13 ASSESSMENT — SOCIAL DETERMINANTS OF HEALTH (SDOH): HOW OFTEN DO YOU GET TOGETHER WITH FRIENDS OR RELATIVES?: THREE TIMES A WEEK

## 2025-01-14 ENCOUNTER — OFFICE VISIT (OUTPATIENT)
Dept: UROLOGY | Facility: CLINIC | Age: 70
End: 2025-01-14
Attending: PHYSICIAN ASSISTANT
Payer: COMMERCIAL

## 2025-01-14 VITALS
DIASTOLIC BLOOD PRESSURE: 86 MMHG | HEART RATE: 72 BPM | OXYGEN SATURATION: 98 % | HEIGHT: 66 IN | WEIGHT: 175 LBS | BODY MASS INDEX: 28.12 KG/M2 | SYSTOLIC BLOOD PRESSURE: 138 MMHG

## 2025-01-14 DIAGNOSIS — N13.9 LOWER URINARY OBSTRUCTIVE SYMPTOM: ICD-10-CM

## 2025-01-14 DIAGNOSIS — R33.9 URINARY RETENTION: ICD-10-CM

## 2025-01-14 DIAGNOSIS — R39.9 LOWER URINARY TRACT SYMPTOMS (LUTS): ICD-10-CM

## 2025-01-14 DIAGNOSIS — N40.2 PROSTATE NODULE: ICD-10-CM

## 2025-01-14 DIAGNOSIS — N40.0 ENLARGED PROSTATE: ICD-10-CM

## 2025-01-14 DIAGNOSIS — Z80.42 FAMILY HISTORY OF PROSTATE CANCER: Primary | ICD-10-CM

## 2025-01-14 LAB — RESIDUAL VOLUME (RV) (EXTERNAL): NORMAL

## 2025-01-14 PROCEDURE — 99204 OFFICE O/P NEW MOD 45 MIN: CPT | Mod: 25 | Performed by: UROLOGY

## 2025-01-14 PROCEDURE — 51798 US URINE CAPACITY MEASURE: CPT | Performed by: UROLOGY

## 2025-01-14 RX ORDER — TAMSULOSIN HYDROCHLORIDE 0.4 MG/1
0.8 CAPSULE ORAL DAILY
Qty: 180 CAPSULE | Refills: 3 | Status: SHIPPED | OUTPATIENT
Start: 2025-01-14

## 2025-01-14 RX ORDER — DIAZEPAM 5 MG/1
5 TABLET ORAL ONCE
Qty: 1 TABLET | Refills: 0 | Status: SHIPPED | OUTPATIENT
Start: 2025-01-14 | End: 2025-01-14

## 2025-01-14 ASSESSMENT — PAIN SCALES - GENERAL: PAINLEVEL_OUTOF10: NO PAIN (0)

## 2025-01-14 NOTE — NURSING NOTE
Chief Complaint   Patient presents with    Benign Prostatic Hypertrophy     Also ct scan 12-    Urinary Frequency    Pvr is 103ml done with bladder scan   Buck Garcia, CMA

## 2025-01-14 NOTE — LETTER
1/14/2025       RE: Guillermo Strauss  [address]  [place] [address]     Dear Colleague,    Thank you for referring your patient, Guillermo Strauss, to the [facility]. Please see a copy of my visit note below.    Name: Guillermo Strauss   MRN: [id number]  YOB: 1955    Assessment and Plan:  69 year old male with a prostate nodule and history of prostate cancer and recent urinary retention which has improved with tamsulosin    1. Lower urinary tract symptoms (LUTS)  2. Prostate nodule  3. Enlarged prostate  4. Urinary retention    Will get a prostate MRI due to nodule and hx of prostate cancer.  Will have follow-up visit after MRI to discuss if bx needed or regarding possible BPH treatment options.    Increased tamsulosin to 0.8 mg and sent to pharmacy on file.       25 minutes spent on day of encounter with patient, counseling, documenation, orders and coordination of care.      Jonh Chinchilla MD  January 14, 2025          Chief Complaint: Urinary retention and elevated PSA    History of Present Illness:  Guillermo Strauss is a 69 year old male seen in consultation from Dr. Honeycutt regarding urinary retention.      Patient has had symptoms worsening for about a year.  Currently, they are voiding spontaneously.  This came to have when he presented to urgent care December 19, 2024.  At that time he had elevated postvoid residual over 300.  They are unable to place a urethral catheter and instead started on tamsulosin which he notes immediately helped his voiding.    They are currently taking an alpha blocker (no side effects), not taking 5-alpha reductase inhibitor, not taking bladder spasm medication, not taking PDE5 inhibitor for their urination.  Patient is not anticoagulated.    +fam hx of prostate cancer in dad and brother    Prostate size 66 ml per my interpretation of CT on 12/19/2024.  Bladder also very distended  "on that exam    AUA score 17  DRE 14    BPH related history:  -- Urinary tract infections  -- Urinary tract stones  -- Gross hematuria  -- Elevated PSA  + History of Urinary retention  -- Chronic kidney disease  -- Prior BPH procedure  -- Prior Prostate biopsy   -- History of prostate cancer  -- Family history of BPH    I reviewed internal records which in summarized above.    I reviewed internal labs, of which pertinent ones include: Hgb   Hemoglobin   Date Value Ref Range Status   2024 13.8 13.3 - 17.7 g/dL Final   2013 13.4 13.3 - 17.7 g/dL Final   , Cr   Creatinine   Date Value Ref Range Status   2024 0.96 0.67 - 1.17 mg/dL Final   2020 1.04 0.66 - 1.25 mg/dL Final     Creatinine POCT   Date Value Ref Range Status   2024 1.2 0.7 - 1.3 mg/dL Final   , PSA   PSA   Date Value Ref Range Status   2020 2.13 0 - 4 ug/L Final     Comment:     Assay Method:  Chemiluminescence using Siemens Vista analyzer     Prostate Specific Antigen Screen   Date Value Ref Range Status   2024 3.71 0.00 - 6.50 ng/mL Final   2021 2.65 0.00 - 4.00 ug/L Final   , No results found for: \"UA\", No components found for: \"UC\"         Past Medical History:  Past Medical History:   Diagnosis Date     Benign essential hypertension      Other and unspecified hyperlipidemia      Preglaucoma, unspecified     follows at [facility]            Past Surgical History:  Past Surgical History:   Procedure Laterality Date     COLONOSCOPY      No polyps     HERNIA REPAIR, UMBILICAL  10            Social History:  Social History     Tobacco Use     Smoking status: Former     Current packs/day: 0.00     Types: Cigarettes     Quit date:      Years since quittin.0     Smokeless tobacco: Never     Tobacco comments:     quit in about  after about 30 pack years   Vaping Use     Vaping status: Never Used   Substance Use Topics     Alcohol use: Yes     Drug use: No            Family History:  Family " "History   Problem Relation Age of Onset     C.A.D. Mother         first MI at age 58     Coronary Artery Disease Mother      Other Cancer Mother      Other Cancer Brother      Diabetes No family hx of      Cerebrovascular Disease No family hx of      Cancer - colorectal No family hx of      Prostate Cancer No family hx of             Allergies:  Allergies   Allergen Reactions     No Known Allergies             Medications:  Current Outpatient Medications   Medication Sig Dispense Refill     ASPIRIN 81 MG OR TABS 1 tab po QD (Once per day)       lisinopril (ZESTRIL) 10 MG tablet Take 1 tablet (10 mg) by mouth daily. Office visit needed prior to additional refills. 90 tablet 0     sildenafil (VIAGRA) 100 MG tablet Take 1 tablet (100 mg) by mouth daily as needed (ED) 6 tablet 11     simvastatin (ZOCOR) 20 MG tablet TAKE 1 TABLET BY MOUTH AT BEDTIME. 90 tablet 4     tamsulosin (FLOMAX) 0.4 MG capsule Take 1 capsule (0.4 mg) by mouth daily. 30 capsule 0     Current Facility-Administered Medications   Medication Dose Route Frequency Provider Last Rate Last Admin     sodium chloride (PF) 0.9% PF flush 3 mL  3 mL Intravenous q1 min prn            Physical Exam:  B/P: 138/86, T: Data Unavailable, P: 72, R: Data Unavailable  Estimated body mass index is 28.25 kg/m  as calculated from the following:    Height as of this encounter: 1.676 m (5' 6\").    Weight as of this encounter: 79.4 kg (175 lb).  General: age-appropriate appearing male in NAD.  : Normal rectal tone, prostate nodule in the right middle prostate    Outside records:   I spent 0 minutes reviewing outside records.        Again, thank you for allowing me to participate in the care of your patient.      Sincerely,    Jonh Chinchilla MD    "

## 2025-01-14 NOTE — PROGRESS NOTES
Name: Guillermo Strauss   MRN: [id number]  YOB: 1955    Assessment and Plan:  69 year old male with a prostate nodule and history of prostate cancer and recent urinary retention which has improved with tamsulosin    1. Lower urinary tract symptoms (LUTS)  2. Prostate nodule  3. Enlarged prostate  4. Urinary retention    Will get a prostate MRI due to nodule and hx of prostate cancer.  Will have follow-up visit after MRI to discuss if bx needed or regarding possible BPH treatment options.    Increased tamsulosin to 0.8 mg and sent to pharmacy on file.       25 minutes spent on day of encounter with patient, counseling, documenation, orders and coordination of care.      Jonh Chinchilla MD  January 14, 2025          Chief Complaint: Urinary retention and elevated PSA    History of Present Illness:  Guillermo Strauss is a 69 year old male seen in consultation from Dr. Honeycutt regarding urinary retention.      Patient has had symptoms worsening for about a year.  Currently, they are voiding spontaneously.  This came to have when he presented to urgent care December 19, 2024.  At that time he had elevated postvoid residual over 300.  They are unable to place a urethral catheter and instead started on tamsulosin which he notes immediately helped his voiding.    They are currently taking an alpha blocker (no side effects), not taking 5-alpha reductase inhibitor, not taking bladder spasm medication, not taking PDE5 inhibitor for their urination.  Patient is not anticoagulated.    +fam hx of prostate cancer in dad and brother    Prostate size 66 ml per my interpretation of CT on 12/19/2024.  Bladder also very distended on that exam    AUA score 17  DRE 14    BPH related history:  -- Urinary tract infections  -- Urinary tract stones  -- Gross hematuria  -- Elevated PSA  + History of Urinary retention  -- Chronic kidney disease  -- Prior BPH procedure  -- Prior Prostate biopsy   -- History of prostate cancer  --  "Family history of BPH    I reviewed internal records which in summarized above.    I reviewed internal labs, of which pertinent ones include: Hgb   Hemoglobin   Date Value Ref Range Status   2024 13.8 13.3 - 17.7 g/dL Final   2013 13.4 13.3 - 17.7 g/dL Final   , Cr   Creatinine   Date Value Ref Range Status   2024 0.96 0.67 - 1.17 mg/dL Final   2020 1.04 0.66 - 1.25 mg/dL Final     Creatinine POCT   Date Value Ref Range Status   2024 1.2 0.7 - 1.3 mg/dL Final   , PSA   PSA   Date Value Ref Range Status   2020 2.13 0 - 4 ug/L Final     Comment:     Assay Method:  Chemiluminescence using Siemens Vista analyzer     Prostate Specific Antigen Screen   Date Value Ref Range Status   2024 3.71 0.00 - 6.50 ng/mL Final   2021 2.65 0.00 - 4.00 ug/L Final   , No results found for: \"UA\", No components found for: \"UC\"         Past Medical History:  Past Medical History:   Diagnosis Date    Benign essential hypertension     Other and unspecified hyperlipidemia     Preglaucoma, unspecified     follows at [facility]            Past Surgical History:  Past Surgical History:   Procedure Laterality Date    COLONOSCOPY      No polyps    HERNIA REPAIR, UMBILICAL  10            Social History:  Social History     Tobacco Use    Smoking status: Former     Current packs/day: 0.00     Types: Cigarettes     Quit date:      Years since quittin.0    Smokeless tobacco: Never    Tobacco comments:     quit in about  after about 30 pack years   Vaping Use    Vaping status: Never Used   Substance Use Topics    Alcohol use: Yes    Drug use: No            Family History:  Family History   Problem Relation Age of Onset    C.A.D. Mother         first MI at age 58    Coronary Artery Disease Mother     Other Cancer Mother     Other Cancer Brother     Diabetes No family hx of     Cerebrovascular Disease No family hx of     Cancer - colorectal No family hx of     Prostate Cancer No family " "hx of             Allergies:  Allergies   Allergen Reactions    No Known Allergies             Medications:  Current Outpatient Medications   Medication Sig Dispense Refill    ASPIRIN 81 MG OR TABS 1 tab po QD (Once per day)      lisinopril (ZESTRIL) 10 MG tablet Take 1 tablet (10 mg) by mouth daily. Office visit needed prior to additional refills. 90 tablet 0    sildenafil (VIAGRA) 100 MG tablet Take 1 tablet (100 mg) by mouth daily as needed (ED) 6 tablet 11    simvastatin (ZOCOR) 20 MG tablet TAKE 1 TABLET BY MOUTH AT BEDTIME. 90 tablet 4    tamsulosin (FLOMAX) 0.4 MG capsule Take 1 capsule (0.4 mg) by mouth daily. 30 capsule 0     Current Facility-Administered Medications   Medication Dose Route Frequency Provider Last Rate Last Admin    sodium chloride (PF) 0.9% PF flush 3 mL  3 mL Intravenous q1 min prn            Physical Exam:  B/P: 138/86, T: Data Unavailable, P: 72, R: Data Unavailable  Estimated body mass index is 28.25 kg/m  as calculated from the following:    Height as of this encounter: 1.676 m (5' 6\").    Weight as of this encounter: 79.4 kg (175 lb).  General: age-appropriate appearing male in NAD.  : Normal rectal tone, prostate nodule in the right middle prostate    Outside records:   I spent 0 minutes reviewing outside records.      "

## 2025-01-15 ENCOUNTER — OFFICE VISIT (OUTPATIENT)
Dept: PEDIATRICS | Facility: CLINIC | Age: 70
End: 2025-01-15
Payer: COMMERCIAL

## 2025-01-15 VITALS
DIASTOLIC BLOOD PRESSURE: 76 MMHG | HEART RATE: 59 BPM | TEMPERATURE: 96.6 F | BODY MASS INDEX: 28.19 KG/M2 | WEIGHT: 175.4 LBS | OXYGEN SATURATION: 98 % | RESPIRATION RATE: 14 BRPM | SYSTOLIC BLOOD PRESSURE: 136 MMHG | HEIGHT: 66 IN

## 2025-01-15 DIAGNOSIS — N52.9 ERECTILE DYSFUNCTION, UNSPECIFIED ERECTILE DYSFUNCTION TYPE: ICD-10-CM

## 2025-01-15 DIAGNOSIS — Z00.00 ENCOUNTER FOR MEDICARE ANNUAL WELLNESS EXAM: Primary | ICD-10-CM

## 2025-01-15 DIAGNOSIS — I10 ESSENTIAL HYPERTENSION: ICD-10-CM

## 2025-01-15 DIAGNOSIS — E78.5 HYPERLIPIDEMIA WITH TARGET LDL LESS THAN 130: ICD-10-CM

## 2025-01-15 PROCEDURE — G0439 PPPS, SUBSEQ VISIT: HCPCS | Performed by: INTERNAL MEDICINE

## 2025-01-15 RX ORDER — SIMVASTATIN 20 MG
TABLET ORAL
Qty: 90 TABLET | Refills: 4 | Status: SHIPPED | OUTPATIENT
Start: 2025-01-15

## 2025-01-15 RX ORDER — LISINOPRIL 10 MG/1
10 TABLET ORAL DAILY
Qty: 90 TABLET | Refills: 4 | Status: SHIPPED | OUTPATIENT
Start: 2025-01-15

## 2025-01-15 RX ORDER — SILDENAFIL 100 MG/1
100 TABLET, FILM COATED ORAL DAILY PRN
Qty: 6 TABLET | Refills: 11 | Status: SHIPPED | OUTPATIENT
Start: 2025-01-15

## 2025-01-15 ASSESSMENT — PAIN SCALES - GENERAL: PAINLEVEL_OUTOF10: NO PAIN (0)

## 2025-01-15 NOTE — PROGRESS NOTES
"Preventive Care Visit  [facility]  Kelton Lopez MD, Internal Medicine - Pediatrics  Marvin 15, 2025      Assessment & Plan       ICD-10-CM    1. Encounter for Medicare annual wellness exam  Z00.00       2. Essential hypertension  I10 lisinopril (ZESTRIL) 10 MG tablet      3. Erectile dysfunction, unspecified erectile dysfunction type  N52.9 sildenafil (VIAGRA) 100 MG tablet      4. Hyperlipidemia with target LDL less than 130  E78.5 simvastatin (ZOCOR) 20 MG tablet        Here for AWV. Overall he is feeling well. HM reviewed.    Medications reviewed and updated. No changes w/ meds listed above.         BMI  Estimated body mass index is 28.74 kg/m  as calculated from the following:    Height as of this encounter: 1.664 m (5' 5.5\").    Weight as of this encounter: 79.6 kg (175 lb 6.4 oz).       Counseling  Appropriate preventive services were addressed with this patient via screening, questionnaire, or discussion as appropriate for fall prevention, nutrition, physical activity, social engagement, weight loss and cognition.  Checklist reviewing preventive services available has been given to the patient.  Reviewed patient's diet, addressing concerns and/or questions.   The patient was provided with written information regarding signs of hearing loss.   Information on urinary incontinence and treatment options given to patient.       Kelton Lopez MD     Sarah Yoder is a 69 year old, presenting for the following:  Medicare Visit        1/15/2025     3:02 PM   Additional Questions   Roomed by Isabel         1/15/2025     3:02 PM   Patient Reported Additional Medications   Patient reports taking the following new medications none       HPI    Here for AWV. Feeling well. No acute concerns today.    HTN. No cardiac sx such as CP, palpitations, PND, orthopnea, HILLS or peripheral edema.  BP Readings from Last 3 Encounters:   01/15/25 136/76   01/14/25 138/86   12/19/24 125/72     ED. Helped by " sildenafil.    Hyperlipidemia.   Lab Results   Component Value Date     12/20/2023     12/19/2022         Health Care Directive  Patient does not have a Health Care Directive: Patient states has Advance Directive and will bring in a copy to clinic.      1/13/2025   General Health   How would you rate your overall physical health? Good   Feel stress (tense, anxious, or unable to sleep) Only a little   (!) STRESS CONCERN      1/13/2025   Nutrition   Diet: Regular (no restrictions)         1/13/2025   Exercise   Days per week of moderate/strenous exercise 7 days   Average minutes spent exercising at this level 30 min         1/13/2025   Social Factors   Frequency of gathering with friends or relatives Three times a week   Worry food won't last until get money to buy more No   Food not last or not have enough money for food? No   Do you have housing? (Housing is defined as stable permanent housing and does not include staying ouside in a car, in a tent, in an abandoned building, in an overnight shelter, or couch-surfing.) Yes   Are you worried about losing your housing? No   Lack of transportation? No   Unable to get utilities (heat,electricity)? No         1/13/2025   Fall Risk   Fallen 2 or more times in the past year? No   Trouble with walking or balance? No          1/13/2025   Activities of Daily Living- Home Safety   Needs help with the following daily activites None of the above   Safety concerns in the home None of the above         1/13/2025   Dental   Dentist two times every year? Yes         1/13/2025   Hearing Screening   Hearing concerns? (!) IT'S HARD TO FOLLOW A CONVERSATION IN A NOISY RESTAURANT OR CROWDED ROOM.    (!) TROUBLE UNDERSTANDING SOFT OR WHISPERED SPEECH.            1/13/2025   Driving Risk Screening   Patient/family members have concerns about driving No         1/13/2025   General Alertness/Fatigue Screening   Have you been more tired than usual lately? No         1/13/2025    Urinary Incontinence Screening   Bothered by leaking urine in past 6 months Yes         2025   TB Screening   Were you born outside of the [place]? No         Today's PHQ-2 Score:       1/15/2025     2:58 PM   PHQ-2 (  [employer])   Q1: Little interest or pleasure in doing things 0   Q2: Feeling down, depressed or hopeless 0   PHQ-2 Score 0    Q1: Little interest or pleasure in doing things Not at all   Q2: Feeling down, depressed or hopeless Not at all   PHQ-2 Score 0       Patient-reported           2025   Substance Use   Alcohol more than 3/day or more than 7/wk No   Do you have a current opioid prescription? No   How severe/bad is pain from 1 to 10? 0/10 (No Pain)   Do you use any other substances recreationally? No     Social History     Tobacco Use    Smoking status: Former     Current packs/day: 0.00     Types: Cigarettes     Quit date:      Years since quittin.0    Smokeless tobacco: Never    Tobacco comments:     quit in about  after about 30 pack years   Vaping Use    Vaping status: Never Used   Substance Use Topics    Alcohol use: Yes    Drug use: No           2025   AAA Screening   Family history of Abdominal Aortic Aneurysm (AAA)? Unsure   ASCVD Risk   The 10-year ASCVD risk score (Chantal DK, et al., 2019) is: 24.2%    Values used to calculate the score:      Age: 69 years      Sex: Male      Is Non- : No      Diabetic: No      Tobacco smoker: No      Systolic Blood Pressure: 136 mmHg      Is BP treated: Yes      HDL Cholesterol: 43 mg/dL      Total Cholesterol: 195 mg/dL      Current providers sharing in care for this patient include:  Patient Care Team:  Kelton Lopez MD as PCP - General  Kelton Lopez MD as Assigned PCP  Jonh Chinchilla MD as MD (Urology)    The following health maintenance items are reviewed in Epic and correct as of today:  Health Maintenance   Topic Date Due    ANNUAL REVIEW OF HM ORDERS  Never done    Pneumococcal  "Vaccine: 50+ Years (1 of 1 - PCV) Never done    DTAP/TDAP/TD IMMUNIZATION (2 - Td or Tdap) 08/31/2020    MEDICARE ANNUAL WELLNESS VISIT  12/20/2024    BMP  12/19/2025    FALL RISK ASSESSMENT  01/15/2026    COLORECTAL CANCER SCREENING  02/01/2026    GLUCOSE  12/19/2027    LIPID  12/20/2028    RSV VACCINE (1 - 1-dose 75+ series) 06/23/2029    ADVANCE CARE PLANNING  01/15/2030    HEPATITIS C SCREENING  Completed    PHQ-2 (once per calendar year)  Completed    INFLUENZA VACCINE  Completed    ZOSTER IMMUNIZATION  Completed    AORTIC ANEURYSM SCREENING (SYSTEM ASSIGNED)  Completed    COVID-19 Vaccine  Completed    HPV IMMUNIZATION  Aged Out    MENINGITIS IMMUNIZATION  Aged Out    RSV MONOCLONAL ANTIBODY  Aged Out    HEPATITIS A IMMUNIZATION  Discontinued    HEPATITIS B IMMUNIZATION  Discontinued            Objective    Exam  /76 (BP Location: Right arm, Patient Position: Sitting, Cuff Size: Adult Regular)   Pulse 59   Temp (!) 96.6  F (35.9  C) (Temporal)   Resp 14   Ht 1.664 m (5' 5.5\")   Wt 79.6 kg (175 lb 6.4 oz)   SpO2 98%   BMI 28.74 kg/m     Estimated body mass index is 28.74 kg/m  as calculated from the following:    Height as of this encounter: 1.664 m (5' 5.5\").    Weight as of this encounter: 79.6 kg (175 lb 6.4 oz).    Physical Exam  GENERAL: healthy, alert and no distress  EYES: PERRL, EOMI  HENT: ear canals and TM's normal. No nasal discharge. OP moist.  NECK: no adenopathy  RESP: lungs clear to auscultation - no rales, rhonchi or wheezes  CV: regular rate and rhythm, normal S1 S2, no murmur, no peripheral edema  ABDOMEN: soft, nontender, bowel sounds normal  MS: no gross musculoskeletal defects noted  SKIN: no suspicious lesions or rashes  NEURO: Normal strength and tone  PSYCH: mentation appears normal, affect normal         1/15/2025   Mini Cog   Clock Draw Score 2 Normal   3 Item Recall 2 objects recalled   Mini Cog Total Score 4              Signed Electronically by: Kelton Lopez MD    "

## 2025-01-24 ENCOUNTER — ANCILLARY PROCEDURE (OUTPATIENT)
Dept: MRI IMAGING | Facility: CLINIC | Age: 70
End: 2025-01-24
Attending: UROLOGY
Payer: COMMERCIAL

## 2025-01-24 DIAGNOSIS — N40.2 PROSTATE NODULE: ICD-10-CM

## 2025-01-24 DIAGNOSIS — Z80.42 FAMILY HISTORY OF PROSTATE CANCER: ICD-10-CM

## 2025-01-24 PROCEDURE — 72197 MRI PELVIS W/O & W/DYE: CPT | Mod: 26 | Performed by: RADIOLOGY

## 2025-01-24 PROCEDURE — 72197 MRI PELVIS W/O & W/DYE: CPT

## 2025-01-24 PROCEDURE — A9585 GADOBUTROL INJECTION: HCPCS | Performed by: UROLOGY

## 2025-01-24 PROCEDURE — 255N000002 HC RX 255 OP 636: Performed by: UROLOGY

## 2025-01-24 RX ORDER — GADOBUTROL 604.72 MG/ML
8 INJECTION INTRAVENOUS ONCE
Status: COMPLETED | OUTPATIENT
Start: 2025-01-24 | End: 2025-01-24

## 2025-01-24 RX ADMIN — GADOBUTROL 8 ML: 604.72 INJECTION INTRAVENOUS at 08:30

## 2025-01-28 ENCOUNTER — VIRTUAL VISIT (OUTPATIENT)
Dept: UROLOGY | Facility: CLINIC | Age: 70
End: 2025-01-28
Payer: COMMERCIAL

## 2025-01-28 DIAGNOSIS — N40.2 PROSTATE NODULE: Primary | ICD-10-CM

## 2025-01-28 PROCEDURE — 98006 SYNCH AUDIO-VIDEO EST MOD 30: CPT | Performed by: UROLOGY

## 2025-01-28 NOTE — NURSING NOTE
Is the patient currently in the state of [place]? YES    Location: home    Visit mode:VIDEO    If the visit is dropped, the patient can be reconnected by: VIDEO VISIT: Text to cell phone:   Telephone Information:   Mobile [phone]    and VIDEO VISIT: Send to e-mail at: [email or web address]    Will anyone else be joining the visit? NO  (If patient encounters technical issues they should call [phone])    Are changes needed to the allergy or medication list? No    Are refills needed on medications prescribed by this physician? NO    Reason for visit: BARTOLOME Deras, Virtual Visit Facilitator    QNR Status: completed

## 2025-01-28 NOTE — LETTER
1/28/2025       RE: Guillermo Strauss  [address]  [place] [address]     Dear Colleague,    Thank you for referring your patient, Guillermo Strauss, to the [facility] at [facility]. Please see a copy of my visit note below.    Virtual Visit Details    Type of service:  Video Visit   Video Start Time: 11:37 AM  Video End Time:11:41 AM    Originating Location (pt. Location): Home    Distant Location (provider location):  On-site  Platform used for Video Visit: [facility]    Assessment & Plan  ASSESSMENT and PLAN  69 year old male here for reevaluation of elevated PSA with the setting of a concerning prostate lesion on MRI and family history of prostate cancer.    1.  Prostate lesion with concern for malignancy  2.  Elevated PSA  3.  Enlarged prostate with lower urinary tract symptoms    Discussed that given the findings on exam and MRI, the next best step would be for a MRI fusion biopsy.  We discussed that one of my partners perform this given their expertise in this particular procedure.    Discussed the risks of prostate biopsy including bleeding, infection, urinary retention.  If there is cancer, further care will be guided by Dr. Traylor who will be performing the biopsy.    If the biopsy is negative, we can proceed with BPH surgical options such as HoLEP.    Jonh Chinchilla MD   Urology  [facility]         Subjective    CHIEF COMPLAINT   follow-up of prostate MRI results.      HPI   Guillermo Strauss is a very pleasant 69 year old male who presents with a history of BPH and elevated PSA.  Had episode of recent urinary retention which is resolved with tamsulosin.  Does have a family history of prostate cancer and a prostate nodule palpable on exam.    Patient underwent a prostate MRI which demonstrated a 90 g prostate, but there was a PI-RADS 4 right apical lesion.  This correlates with what was palpable on  exam.          Again, thank you for allowing me to participate in the care of your patient.      Sincerely,    Jonh Chinchilla MD

## 2025-01-28 NOTE — PROGRESS NOTES
Virtual Visit Details    Type of service:  Video Visit   Video Start Time: 11:37 AM  Video End Time:11:41 AM    Originating Location (pt. Location): Home    Distant Location (provider location):  On-site  Platform used for Video Visit: [facility]    Assessment & Plan   ASSESSMENT and PLAN  69 year old male here for reevaluation of elevated PSA with the setting of a concerning prostate lesion on MRI and family history of prostate cancer.    1.  Prostate lesion with concern for malignancy  2.  Elevated PSA  3.  Enlarged prostate with lower urinary tract symptoms    Discussed that given the findings on exam and MRI, the next best step would be for a MRI fusion biopsy.  We discussed that one of my partners perform this given their expertise in this particular procedure.    Discussed the risks of prostate biopsy including bleeding, infection, urinary retention.  If there is cancer, further care will be guided by Dr. Traylor who will be performing the biopsy.    If the biopsy is negative, we can proceed with BPH surgical options such as HoLEP.    Jonh Chinchilla MD   Urology  [facility]         Subjective     CHIEF COMPLAINT   follow-up of prostate MRI results.      HPI   Guillermo Strauss is a very pleasant 69 year old male who presents with a history of BPH and elevated PSA.  Had episode of recent urinary retention which is resolved with tamsulosin.  Does have a family history of prostate cancer and a prostate nodule palpable on exam.    Patient underwent a prostate MRI which demonstrated a 90 g prostate, but there was a PI-RADS 4 right apical lesion.  This correlates with what was palpable on exam.

## 2025-01-30 DIAGNOSIS — Z79.2 PROPHYLACTIC ANTIBIOTIC: Primary | ICD-10-CM

## 2025-01-30 RX ORDER — CIPROFLOXACIN 500 MG/1
500 TABLET, FILM COATED ORAL 2 TIMES DAILY
Qty: 6 TABLET | Refills: 0 | Status: SHIPPED | OUTPATIENT
Start: 2025-01-30 | End: 2025-02-02

## 2025-02-17 ENCOUNTER — VIRTUAL VISIT (OUTPATIENT)
Dept: UROLOGY | Facility: CLINIC | Age: 70
End: 2025-02-17
Payer: COMMERCIAL

## 2025-02-17 DIAGNOSIS — C61 PROSTATE CANCER (H): Primary | ICD-10-CM

## 2025-02-17 PROCEDURE — 98006 SYNCH AUDIO-VIDEO EST MOD 30: CPT | Performed by: STUDENT IN AN ORGANIZED HEALTH CARE EDUCATION/TRAINING PROGRAM

## 2025-02-17 RX ORDER — ACETAMINOPHEN 325 MG/1
975 TABLET ORAL ONCE
OUTPATIENT
Start: 2025-02-17 | End: 2025-02-17

## 2025-02-17 RX ORDER — CEFAZOLIN SODIUM 2 G/50ML
2 SOLUTION INTRAVENOUS
OUTPATIENT
Start: 2025-02-17

## 2025-02-17 RX ORDER — ACETAMINOPHEN 650 MG/1
650 SUPPOSITORY RECTAL ONCE
OUTPATIENT
Start: 2025-02-17

## 2025-02-17 RX ORDER — CEFAZOLIN SODIUM 2 G/50ML
2 SOLUTION INTRAVENOUS SEE ADMIN INSTRUCTIONS
OUTPATIENT
Start: 2025-02-17

## 2025-02-17 NOTE — LETTER
2/17/2025       RE: Guillermo Strauss  [address]  [place] [address]     Dear Colleague,    Thank you for referring your patient, Guillermo Strauss, to the [facility] [place] [facility]. Please see a copy of my visit note below.    Virtual Visit Details    Type of service:  Video Visit   Video Start Time: 11:02 AM  Video End Time:11:24 AM    Originating Location (pt. Location): Home    Distant Location (provider location):  On-site  Platform used for Video Visit: Warren  HPI:  Guillermo Strauss is a 69 year old male being seen for discussion of prostate biopsy results.  Duration of problem: 2 weeks  Previous treatments: None yet      Reviewed previous notes  Denies any specific issues at this time  Doing well after prostate biopsy         Review of Systems:  From intake questionnaire     Skin: negative  Eyes: negative  Ears/Nose/Throat: negative  Respiratory: No shortness of breath, dyspnea on exertion, cough, or hemoptysis  Cardiovascular: No chest pain or palpitations  Gastrointestinal: negative; no nausea/vomiting, constipation or diarrhea  Genitourinary: as per HPI  Musculoskeletal: negative  Neurologic: negative  Psychiatric: negative  Hematologic/Lymphatic/Immunologic: negative  Endocrine: negative         Physical Exam:   This is a virtual visit    Alert, no acute distress, oriented, conversant    Ears/nose/mouth: mouth:normal, good dentition  Respiratory: no respiratory distress, or pursed lip breathing  Cardiovascular:no obvious jugular venous distension present  Skin: no suspicious lesions or rashes on Visible body parts on the Screen  Neuro: Alert, oriented, speech and mentation normal  Psych: affect and mood normal, alert and oriented to person, place and time  Review of Imaging:  The following imaging exams were independently viewed and interpreted by me and discussed with patient:  Narrative & Impression   MRI PROSTATE: 1/24/2025  9:34 AM     CLINICAL HISTORY: right mid prostate nodule, fam hx of prostate  cancer, assess for any suspicous, targetable lesions; Prostate nodule;  Family history of prostate cancer     Most Recent PSA: 3.71 ug/L     Comparison: None.     TECHNIQUE:  The following sequences were obtained: High-resolution axial  T2-weighted, coronal T2-weighted, 3D volumetric T2-weighted, axial  pre-contrast T1, axial diffusion-weighted, axial apparent diffusion  coefficient and axial dynamic contrast-enhanced T1. Postcontrast  images were evaluated on a separate workstation to evaluate dynamic  contrast enhancement. The technique of this exam is PI-RADS v2.1  compliant. Contrast dose: 8 mL Gadavist     FINDINGS:     Prostate Image Quality (PI-QUAL)     T2-weighted images: 4/4  Diffusion-weighted images: 4/4  Dynamic contrast enhancement images: Positive     PI-QUAL score: 3 - Optimal quality     Size: 90 grams  Hemorrhage: Absent  Peripheral zone: Heterogeneous on T2-weighted images. Suspicious  lesions as detailed below.  Transition zone: Enlarged with BPH changes. Transition zone nodules  which are circumscribed or mostly encapsulated without diffusion  restriction.  PI-RADS 2.  No highly suspicious nodules.     Lesion(s) in rank order of severity (highest score- to lowest score,  then by size)      Lesion 1:  Location: Right apex peripheral zone at the 7 o'clock position  relative to the urethra. Series 9 image 62.   Additional prostate regions involved: None  Size: 10 mm  T2 description: Homogenous, moderately hypointense  T2 numerical assessment: 4  DWI description: Markedly hyperintense on DWI, hypointense on ADC  DWI numerical assessment: 4  DCE assessment: Positive    Prostate margin: Capsular abutment 6-15 mm with smooth contour  Lesion overall PI-RADS category: 4     Neurovascular bundles: No neurovascular bundle involvement by  malignancy.  Seminal vesicles: No seminal vesicle involvement by malignancy.  Lymph nodes: No  lymph node involvement  Bones: No suspicious lesions  Other pelvic organs: Fat-containing inguinal hernias, right greater  than left. Mildly trabeculated urinary bladder.                                                                      IMPRESSION:  1. Based on the most suspicious abnormality, this exam is  characterized as PIRADS 4 - Clinically significant cancer is likely to  be present.  The most suspicious abnormality is located at the right  apex peripheral zone and there is short segment capsular abutment with  low likelihood of minimal extraprostatic extension.  2. No suspicious adenopathy or evidence of pelvic metastases.          Review of Labs:  The following labs were reviewed by me and discussed with the patient:  Final Diagnosis   A.  Prostate, left base x 2 , biopsy   - PROSTATIC ADENOCARCINOMA, ACINAR TYPE, Vero's Score 3+3 = 6, (ISUP Grade Group 1) number of cores involved is 1 of 2, approximate total surface area involved is 15%,  perineural invasion is  identified        B.  Prostate,left mid x 2, biopsy   -PROSTATIC ADENOCARCINOMA, ACINAR TYPE, Vero's Score 3+3 = 6, (ISUP Grade Group 1) number of cores involved is 1 of 2, approximate total surface area involved is <10%.     C.  Prostate, left apex x 2, biopsy   -PROSTATIC ADENOCARCINOMA, ACINAR TYPE, Vero's Score 3+3 = 6, (ISUP Grade Group 1) number of cores involved is 2 of 2, approximate total surface area involved is 15-20%,perineural invasion is identified        D.  Prostate, right base x 2, biopsy   - PROSTATIC ADENOCARCINOMA, ACINAR TYPE, Atwood's Score 3+3 = 6, (ISUP Grade Group 1) number of cores involved is 2 of 2, approximate total surface area involved is 30%,perineural invasion is identified        E.  Prostate, right mid x 2, biopsy   - PROSTATIC ADENOCARCINOMA, ACINAR TYPE, Vero's Score 3+3 = 6, (ISUP Grade Group 1) number of cores involved is 2 of 2, approximate total surface area involved is 45%.     F.  Prostate,  right apex x 2, biopsy   - PROSTATIC ADENOCARCINOMA, ACINAR TYPE, Montello's Score 3+3 = 6, (ISUP Grade Group 1) number of cores involved is 1 of 2, approximate total surface area involved is 45%,perineural invasion is identified        G.  Prostate, target right apex 7 o'clock x 3, biopsy   - PROSTATIC ADENOCARCINOMA, ACINAR TYPE, Vero's Score 3+4 = 7, (ISUP Grade Group 2) number of cores involved is 2 of 5, approximate total surface area involved is 30-40%, approximate percent of grade 4 is 45%, cribriform glands are present,perineural invasion is  identified            Electronically signed by Roxy Argueta MD on 2/10/2025 at 12:32 PM     Component      Latest Ref Rng 12/19/2022  2:12 PM 12/20/2023  11:14 AM 12/19/2024  9:52 AM   Prostate Specific Antigen Screen      0.00 - 6.50 ng/mL 2.88  2.98  3.71        Assessment & Plan    Prostate cancer (H)  We had an extensive discussion about the significance of localized, prostate cancer.  His estimated risk of extraprostatic disease is less than 2% therefore no additional diagnostic imaging is indicated at this time.     We discussed the options for treatment of a localized prostate cancer including active surveillance (reviewing the [place] experience), brachytherapy, external beam, and  proton beam radiation therapy, as well as surgical extirpation.  We briefly mentioned cryotherapy, but the results are not great in the primary setting and they almost uniformly lead to loss of erections.  We discussed the fact that all prostate cancer treatments leads to the loss or decrease in sexual function.  This loss usually occurs immediately with surgery and then improves as the patient heals and with radiation there is usually no effect, then it drops off at a faster than expected pace such that 2-3 years down the road, the number of men suffering from ED after treatment for their prostate cancer is approximately equal.    We also discussed the advantages and  disadvantages and roles of open surgery vs. laparoscopic (and Da Hannah assisted) surgery.  I noted that though robotic surgery has been associated with shorter hospitalization, shorter time to complete recovery, fewer blood transfusions and lower risk of bladder neck contractures, in the most important domains, cancer control, preservation of urinary function and preservation of sexual function, the outcomes have been quite comparable.    The anticipated post-operative course was explained, including the anticipated hospital stay.  With surgery we discussed the need for a catheter post-operatively for between 7-10 days to serve as a scaffolding for the bladder neck to heal to the urethra.  We also discussed the risk of post operative urinary incontinence once the cathter is removed.  We discussed that we would recommend pelvic floor physical therapy and that sometimes urinary recovery takes several months to up to a year to recover control.  Approximately 90-95% of men are continent at one year after surgery, but most men describe that their continence is different after surgery.    Today we talked in detail about the procedure of radical prostatectomy including the postoperative hospital stay and goals of discharge/follow-up strategy.  He would be expected to be in surgery for anywhere between 3-1/2 to 4-1/2 hours.  Plan will be to proceed with laparoscopic access with robotic assist for prostatectomy along with possible  bilateral pelvic lymph node dissection. After procedure he is expected to have a Rendon catheter in the bladder draining it for at least 10 days, and an abdominal drain, which will be removed prior to his discharge.  Goals of discharge were discussed as well as the period after  catheter removal .  The possibility of intraoperative and postoperative complications were discussed including but not limited to bleeding, need for blood transfusion, wound infection, infected collections, DVT, PE as well  as the rare possibility of surrounding organ injury including the rectum, bladder as well as the bladder vessels of the pelvis.  We briefly discussed the management strategies of each of these issues.  We also talked about postoperative follow-up strategies including penile rehabilitation in terms of erections, pelvic floor physical therapy as well as Kegel exercises, and PSA monitoring on follow-up.  Outcomes were also discussed including long-term issues of erectile dysfunction and postoperative incontinence.  All questions were answered to their satisfaction.  We will have him scheduled for the surgery next available date       I suggested a referral to radiation oncology which I believe is helpful to get adequate information to make the best decision that is best for the patient.  He is not interested in a radiation consult.  We discussed national survey results suggesting that for identical patients, surgeons and radiation oncologists suggest their respective method of treating prostate cancer as the most appropriate the majority of the time.  But for most cases of prostate cancer there appears to be clinical equipoise between these approaches and this allows the patient preferences to be expressed.    Patient has decided he would like to proceed with Robotic Radical Prostatectomy - 90723    The risks, benefits, alternatives, and personnel involved in the procudure were discussed.  All questions were answered.  A written informed consent will be finalized on the morning of the procedure.    - Case Request: PROSTATECTOMY, ROBOT-ASSISTED, USING DA SALINAS XI, WITH  Possible bilateral PELVIC LYMPHADENECTOMY; Standing  - Case Request: PROSTATECTOMY, ROBOT-ASSISTED, USING DA SALINAS XI, WITH  Possible bilateral PELVIC LYMPHADENECTOMY      Kervin Traylor MD  [facility] [place]      ==========================    Additional Billing and Coding Information:  Review of external notes as documented  above   Review of the result(s) of each unique test - PSA, surgical pathology, MRI prostate              30 minutes spent by me on the date of the encounter doing chart review, review of test results, interpretation of tests, patient visit, and documentation       Again, thank you for allowing me to participate in the care of your patient.      Sincerely,    Kervin Traylor MD

## 2025-02-17 NOTE — NURSING NOTE
Current patient location:  [place]    Is the patient currently in the state of [place]? YES    Visit mode: VIDEO    If the visit is dropped, the patient can be reconnected by:VIDEO VISIT: Text to cell phone:   Telephone Information:   Mobile [phone]       Will anyone else be joining the visit? NO  (If patient encounters technical issues they should call [phone])    Are changes needed to the allergy or medication list? No    Are refills needed on medications prescribed by this physician? NO    Rooming Documentation:  Questionnaire(s) completed    Reason for visit: RECHECK    Chanda CHÁVEZ

## 2025-02-17 NOTE — PROGRESS NOTES
Virtual Visit Details    Type of service:  Video Visit   Video Start Time: 11:02 AM  Video End Time:11:24 AM    Originating Location (pt. Location): Home    Distant Location (provider location):  On-site  Platform used for Video Visit: [facility]  HPI:  Guillermo Strauss is a 69 year old male being seen for discussion of prostate biopsy results.  Duration of problem: 2 weeks  Previous treatments: None yet      Reviewed previous notes  Denies any specific issues at this time  Doing well after prostate biopsy         Review of Systems:  From intake questionnaire     Skin: negative  Eyes: negative  Ears/Nose/Throat: negative  Respiratory: No shortness of breath, dyspnea on exertion, cough, or hemoptysis  Cardiovascular: No chest pain or palpitations  Gastrointestinal: negative; no nausea/vomiting, constipation or diarrhea  Genitourinary: as per HPI  Musculoskeletal: negative  Neurologic: negative  Psychiatric: negative  Hematologic/Lymphatic/Immunologic: negative  Endocrine: negative         Physical Exam:   This is a virtual visit    Alert, no acute distress, oriented, conversant    Ears/nose/mouth: mouth:normal, good dentition  Respiratory: no respiratory distress, or pursed lip breathing  Cardiovascular:no obvious jugular venous distension present  Skin: no suspicious lesions or rashes on Visible body parts on the Screen  Neuro: Alert, oriented, speech and mentation normal  Psych: affect and mood normal, alert and oriented to person, place and time  Review of Imaging:  The following imaging exams were independently viewed and interpreted by me and discussed with patient:  Narrative & Impression   MRI PROSTATE: 1/24/2025 9:34 AM     CLINICAL HISTORY: right mid prostate nodule, fam hx of prostate  cancer, assess for any suspicous, targetable lesions; Prostate nodule;  Family history of prostate cancer     Most Recent PSA: 3.71 ug/L     Comparison: None.     TECHNIQUE:  The following sequences were obtained: High-resolution  axial  T2-weighted, coronal T2-weighted, 3D volumetric T2-weighted, axial  pre-contrast T1, axial diffusion-weighted, axial apparent diffusion  coefficient and axial dynamic contrast-enhanced T1. Postcontrast  images were evaluated on a separate workstation to evaluate dynamic  contrast enhancement. The technique of this exam is PI-RADS v2.1  compliant. Contrast dose: 8 mL Gadavist     FINDINGS:     Prostate Image Quality (PI-QUAL)     T2-weighted images: 4/4  Diffusion-weighted images: 4/4  Dynamic contrast enhancement images: Positive     PI-QUAL score: 3 - Optimal quality     Size: 90 grams  Hemorrhage: Absent  Peripheral zone: Heterogeneous on T2-weighted images. Suspicious  lesions as detailed below.  Transition zone: Enlarged with BPH changes. Transition zone nodules  which are circumscribed or mostly encapsulated without diffusion  restriction.  PI-RADS 2.  No highly suspicious nodules.     Lesion(s) in rank order of severity (highest score- to lowest score,  then by size)      Lesion 1:  Location: Right apex peripheral zone at the 7 o'clock position  relative to the urethra. Series 9 image 62.   Additional prostate regions involved: None  Size: 10 mm  T2 description: Homogenous, moderately hypointense  T2 numerical assessment: 4  DWI description: Markedly hyperintense on DWI, hypointense on ADC  DWI numerical assessment: 4  DCE assessment: Positive    Prostate margin: Capsular abutment 6-15 mm with smooth contour  Lesion overall PI-RADS category: 4     Neurovascular bundles: No neurovascular bundle involvement by  malignancy.  Seminal vesicles: No seminal vesicle involvement by malignancy.  Lymph nodes: No lymph node involvement  Bones: No suspicious lesions  Other pelvic organs: Fat-containing inguinal hernias, right greater  than left. Mildly trabeculated urinary bladder.                                                                      IMPRESSION:  1. Based on the most suspicious abnormality, this exam  is  characterized as PIRADS 4 - Clinically significant cancer is likely to  be present.  The most suspicious abnormality is located at the right  apex peripheral zone and there is short segment capsular abutment with  low likelihood of minimal extraprostatic extension.  2. No suspicious adenopathy or evidence of pelvic metastases.          Review of Labs:  The following labs were reviewed by me and discussed with the patient:  Final Diagnosis   A.  Prostate, left base x 2 , biopsy   - PROSTATIC ADENOCARCINOMA, ACINAR TYPE, [place]'s Score 3+3 = 6, (ISUP Grade Group 1) number of cores involved is 1 of 2, approximate total surface area involved is 15%,  perineural invasion is  identified        B.  Prostate,left mid x 2, biopsy   -PROSTATIC ADENOCARCINOMA, ACINAR TYPE, [place]'s Score 3+3 = 6, (ISUP Grade Group 1) number of cores involved is 1 of 2, approximate total surface area involved is <10%.     C.  Prostate, left apex x 2, biopsy   -PROSTATIC ADENOCARCINOMA, ACINAR TYPE, Vero's Score 3+3 = 6, (ISUP Grade Group 1) number of cores involved is 2 of 2, approximate total surface area involved is 15-20%,perineural invasion is identified        D.  Prostate, right base x 2, biopsy   - PROSTATIC ADENOCARCINOMA, ACINAR TYPE, [place]'s Score 3+3 = 6, (ISUP Grade Group 1) number of cores involved is 2 of 2, approximate total surface area involved is 30%,perineural invasion is identified        E.  Prostate, right mid x 2, biopsy   - PROSTATIC ADENOCARCINOMA, ACINAR TYPE, Vero's Score 3+3 = 6, (ISUP Grade Group 1) number of cores involved is 2 of 2, approximate total surface area involved is 45%.     F.  Prostate, right apex x 2, biopsy   - PROSTATIC ADENOCARCINOMA, ACINAR TYPE, [place]'s Score 3+3 = 6, (ISUP Grade Group 1) number of cores involved is 1 of 2, approximate total surface area involved is 45%,perineural invasion is identified        G.  Prostate, target right apex 7 o'clock x 3, biopsy   - PROSTATIC  ADENOCARCINOMA, ACINAR TYPE, [place]'s Score 3+4 = 7, (ISUP Grade Group 2) number of cores involved is 2 of 5, approximate total surface area involved is 30-40%, approximate percent of grade 4 is 45%, cribriform glands are present,perineural invasion is  identified            Electronically signed by Roxy Argueta MD on 2/10/2025 at 12:32 PM     Component      Latest Ref Rng 12/19/2022  2:12 PM 12/20/2023  11:14 AM 12/19/2024  9:52 AM   Prostate Specific Antigen Screen      0.00 - 6.50 ng/mL 2.88  2.98  3.71        Assessment & Plan     Prostate cancer (H)  We had an extensive discussion about the significance of localized, prostate cancer.  His estimated risk of extraprostatic disease is less than 2% therefore no additional diagnostic imaging is indicated at this time.     We discussed the options for treatment of a localized prostate cancer including active surveillance (reviewing the [place] experience), brachytherapy, external beam, and  proton beam radiation therapy, as well as surgical extirpation.  We briefly mentioned cryotherapy, but the results are not great in the primary setting and they almost uniformly lead to loss of erections.  We discussed the fact that all prostate cancer treatments leads to the loss or decrease in sexual function.  This loss usually occurs immediately with surgery and then improves as the patient heals and with radiation there is usually no effect, then it drops off at a faster than expected pace such that 2-3 years down the road, the number of men suffering from ED after treatment for their prostate cancer is approximately equal.    We also discussed the advantages and disadvantages and roles of open surgery vs. laparoscopic (and Da Hannah assisted) surgery.  I noted that though robotic surgery has been associated with shorter hospitalization, shorter time to complete recovery, fewer blood transfusions and lower risk of bladder neck contractures, in the most important  domains, cancer control, preservation of urinary function and preservation of sexual function, the outcomes have been quite comparable.    The anticipated post-operative course was explained, including the anticipated hospital stay.  With surgery we discussed the need for a catheter post-operatively for between 7-10 days to serve as a scaffolding for the bladder neck to heal to the urethra.  We also discussed the risk of post operative urinary incontinence once the cathter is removed.  We discussed that we would recommend pelvic floor physical therapy and that sometimes urinary recovery takes several months to up to a year to recover control.  Approximately 90-95% of men are continent at one year after surgery, but most men describe that their continence is different after surgery.    Today we talked in detail about the procedure of radical prostatectomy including the postoperative hospital stay and goals of discharge/follow-up strategy.  He would be expected to be in surgery for anywhere between 3-1/2 to 4-1/2 hours.  Plan will be to proceed with laparoscopic access with robotic assist for prostatectomy along with possible  bilateral pelvic lymph node dissection. After procedure he is expected to have a Rendon catheter in the bladder draining it for at least 10 days, and an abdominal drain, which will be removed prior to his discharge.  Goals of discharge were discussed as well as the period after  catheter removal .  The possibility of intraoperative and postoperative complications were discussed including but not limited to bleeding, need for blood transfusion, wound infection, infected collections, DVT, PE as well as the rare possibility of surrounding organ injury including the rectum, bladder as well as the bladder vessels of the pelvis.  We briefly discussed the management strategies of each of these issues.  We also talked about postoperative follow-up strategies including penile rehabilitation in terms of  erections, pelvic floor physical therapy as well as Kegel exercises, and PSA monitoring on follow-up.  Outcomes were also discussed including long-term issues of erectile dysfunction and postoperative incontinence.  All questions were answered to their satisfaction.  We will have him scheduled for the surgery next available date       I suggested a referral to radiation oncology which I believe is helpful to get adequate information to make the best decision that is best for the patient.  He is not interested in a radiation consult.  We discussed national survey results suggesting that for identical patients, surgeons and radiation oncologists suggest their respective method of treating prostate cancer as the most appropriate the majority of the time.  But for most cases of prostate cancer there appears to be clinical equipoise between these approaches and this allows the patient preferences to be expressed.    Patient has decided he would like to proceed with Robotic Radical Prostatectomy - [id number]    The risks, benefits, alternatives, and personnel involved in the procudure were discussed.  All questions were answered.  A written informed consent will be finalized on the morning of the procedure.    - Case Request: PROSTATECTOMY, ROBOT-ASSISTED, USING DA SALINAS XI, WITH  Possible bilateral PELVIC LYMPHADENECTOMY; Standing  - Case Request: PROSTATECTOMY, ROBOT-ASSISTED, USING DA SALINAS XI, WITH  Possible bilateral PELVIC LYMPHADENECTOMY      Kervin Traylor MD  [facility] [place]      ==========================    Additional Billing and Coding Information:  Review of external notes as documented above   Review of the result(s) of each unique test - PSA, surgical pathology, MRI prostate              30 minutes spent by me on the date of the encounter doing chart review, review of test results, interpretation of tests, patient visit, and documentation

## 2025-03-11 ENCOUNTER — OFFICE VISIT (OUTPATIENT)
Dept: PEDIATRICS | Facility: CLINIC | Age: 70
End: 2025-03-11
Payer: COMMERCIAL

## 2025-03-11 VITALS
RESPIRATION RATE: 16 BRPM | DIASTOLIC BLOOD PRESSURE: 79 MMHG | HEART RATE: 62 BPM | BODY MASS INDEX: 28.98 KG/M2 | TEMPERATURE: 97.2 F | OXYGEN SATURATION: 96 % | HEIGHT: 66 IN | SYSTOLIC BLOOD PRESSURE: 132 MMHG | WEIGHT: 180.3 LBS

## 2025-03-11 DIAGNOSIS — Z01.818 PREOP GENERAL PHYSICAL EXAM: Primary | ICD-10-CM

## 2025-03-11 DIAGNOSIS — I10 BENIGN ESSENTIAL HYPERTENSION: ICD-10-CM

## 2025-03-11 DIAGNOSIS — C61 PROSTATE CANCER (H): ICD-10-CM

## 2025-03-11 PROCEDURE — 1126F AMNT PAIN NOTED NONE PRSNT: CPT | Performed by: INTERNAL MEDICINE

## 2025-03-11 PROCEDURE — 3075F SYST BP GE 130 - 139MM HG: CPT | Performed by: INTERNAL MEDICINE

## 2025-03-11 PROCEDURE — 3078F DIAST BP <80 MM HG: CPT | Performed by: INTERNAL MEDICINE

## 2025-03-11 PROCEDURE — 93000 ELECTROCARDIOGRAM COMPLETE: CPT | Performed by: INTERNAL MEDICINE

## 2025-03-11 PROCEDURE — 99213 OFFICE O/P EST LOW 20 MIN: CPT | Performed by: INTERNAL MEDICINE

## 2025-03-11 ASSESSMENT — PAIN SCALES - GENERAL: PAINLEVEL_OUTOF10: NO PAIN (0)

## 2025-03-11 NOTE — PROGRESS NOTES
Preoperative Evaluation  [facility]  [address]  [address]  [place] [address]  Phone: [phone]  Fax: [phone]  Primary Provider: Kelton Lopez MD  Pre-op Performing Provider: Kelton Lopez MD  Mar 11, 2025             3/10/2025   Surgical Information   What procedure is being done? Prostate surgery   Facility or Hospital where procedure/surgery will be performed: [facility]   Who is doing the procedure / surgery? Dr Traylor   Date of surgery / procedure: 3/31/2025   Time of surgery / procedure: 10am   Where do you plan to recover after surgery? at home with family     Fax number for surgical facility: Note does not need to be faxed, will be available electronically in Epic.    Assessment & Plan     The proposed surgical procedure is considered INTERMEDIATE risk.      ICD-10-CM    1. Preop general physical exam  Z01.818 EKG 12-lead complete w/read - Clinics      2. Prostate cancer (H)  C61 EKG 12-lead complete w/read - Clinics      3. Benign essential hypertension  I10 EKG 12-lead complete w/read - Clinics        Aspirin will remain on hold  Should take Lisinopril and Simvastatin the night prior to the surgery  Should be able to discontinue tamsulosin the day of surgery       Recommendation  Approval given to proceed with proposed procedure, without further diagnostic evaluation.    Kelton Lopez MD      Sarah Yoder is a 69 year old, presenting for the following:  Pre-Op Exam          3/11/2025     8:36 AM   Additional Questions   Roomed by Berta Nixon     HPI: Recently diagnosed prostate cancer. Plan for surgical intervention as noted above.           3/10/2025   Pre-Op Questionnaire   Have you ever had a heart attack or stroke? No   Have you ever had surgery on your heart or blood vessels, such as a stent placement, a coronary artery bypass, or surgery on an artery in your head, neck, heart, or legs? No   Do you have chest pain with activity? No   Do  you have a history of heart failure? No   Do you currently have a cold, bronchitis or symptoms of other infection? No   Do you have a cough, shortness of breath, or wheezing? No   Do you or anyone in your family have previous history of blood clots? No   Do you or does anyone in your family have a serious bleeding problem such as prolonged bleeding following surgeries or cuts? No   Have you ever had problems with anemia or been told to take iron pills? No   Have you had any abnormal blood loss such as black, tarry or bloody stools? No   Have you ever had a blood transfusion? No   Are you willing to have a blood transfusion if it is medically needed before, during, or after your surgery? Yes   Have you or any of your relatives ever had problems with anesthesia? No   Do you have sleep apnea, excessive snoring or daytime drowsiness? No   Do you have any artifical heart valves or other implanted medical devices like a pacemaker, defibrillator, or continuous glucose monitor? No   Do you have artificial joints? No   Are you allergic to latex? No     Chronic health issues reviewed.  HTN. BP has been well controlled. No cardiac sx such as CP, palpitations, PND, orthopnea, HILLS or peripheral edema.     Hyperlipidemia. Under good control with simvastatin.     Patient Active Problem List   Diagnosis    Hyperlipidemia with target LDL less than 130    Internal hemorrhoids    Anxiety    Benign essential hypertension    Erectile dysfunction, unspecified erectile dysfunction type    Prostate cancer (H)         Past Medical History:   Diagnosis Date    Benign essential hypertension     Other and unspecified hyperlipidemia     Preglaucoma, unspecified     follows at [facility]     Past Surgical History:   Procedure Laterality Date    COLONOSCOPY  2021    No polyps    HERNIA REPAIR, UMBILICAL  05/24/2011    MR PROSTATE BIOPSY Bilateral 02/07/2025     Current Outpatient Medications   Medication Sig Dispense Refill    ASPIRIN 81 MG OR  "TABS 1 tab po QD (Once per day) (Patient not taking: Reported on 2025)      lisinopril (ZESTRIL) 10 MG tablet Take 1 tablet (10 mg) by mouth daily. 90 tablet 4    sildenafil (VIAGRA) 100 MG tablet Take 1 tablet (100 mg) by mouth daily as needed (ED). 6 tablet 11    simvastatin (ZOCOR) 20 MG tablet TAKE 1 TABLET BY MOUTH AT BEDTIME. 90 tablet 4    tamsulosin (FLOMAX) 0.4 MG capsule Take 2 capsules (0.8 mg) by mouth daily. 180 capsule 3       Allergies   Allergen Reactions    No Known Allergies         Social History     Tobacco Use    Smoking status: Former     Current packs/day: 0.00     Types: Cigarettes     Quit date:      Years since quittin.2    Smokeless tobacco: Never    Tobacco comments:     quit in about  after about 30 pack years   Substance Use Topics    Alcohol use: Yes       History   Drug Use No               Objective    /79 (BP Location: Right arm, Patient Position: Sitting, Cuff Size: Adult Large)   Pulse 62   Temp 97.2  F (36.2  C) (Temporal)   Resp 16   Ht 1.676 m (5' 6\")   Wt 81.8 kg (180 lb 4.8 oz)   SpO2 96%   BMI 29.10 kg/m     Estimated body mass index is 29.1 kg/m  as calculated from the following:    Height as of this encounter: 1.676 m (5' 6\").    Weight as of this encounter: 81.8 kg (180 lb 4.8 oz).  Physical Exam  GENERAL: healthy, alert and no distress  EYES: PERRL, EOMI  HENT: ear canals and TM's normal. No nasal discharge. OP moist.  NECK: no adenopathy  RESP: lungs clear to auscultation - no rales, rhonchi or wheezes  CV: regular rate and rhythm, normal S1 S2, no murmur, no peripheral edema  ABDOMEN: soft, nontender, bowel sounds normal  MS: no gross musculoskeletal defects noted  SKIN: no suspicious lesions or rashes  NEURO: Normal strength and tone  PSYCH: mentation appears normal, affect normal     Recent Labs   Lab Test 24  1018 24  0952   HGB  --  13.8   PLT  --  229   NA  --  142   POTASSIUM  --  4.4   CR 1.2 0.96        Diagnostics  No " labs were ordered during this visit.     EKG: sinus bradycardia, normal axis, normal intervals, no acute ST/T changes c/w ischemia, no LVH by voltage criteria, no significant change compared to tracing 5/18/2011           Signed Electronically by: Kelton Lopez MD

## 2025-03-12 ENCOUNTER — TELEPHONE (OUTPATIENT)
Dept: UROLOGY | Facility: CLINIC | Age: 70
End: 2025-03-12
Payer: COMMERCIAL

## 2025-03-12 ENCOUNTER — MYC MEDICAL ADVICE (OUTPATIENT)
Dept: UROLOGY | Facility: CLINIC | Age: 70
End: 2025-03-12
Payer: COMMERCIAL

## 2025-03-12 DIAGNOSIS — R73.03 PREDIABETES: ICD-10-CM

## 2025-03-12 DIAGNOSIS — R33.9 URINE RETENTION: ICD-10-CM

## 2025-03-12 DIAGNOSIS — Z01.818 PREOPERATIVE EXAMINATION: Primary | ICD-10-CM

## 2025-03-25 ENCOUNTER — LAB (OUTPATIENT)
Dept: LAB | Facility: CLINIC | Age: 70
End: 2025-03-25
Payer: COMMERCIAL

## 2025-03-25 DIAGNOSIS — R33.9 URINE RETENTION: ICD-10-CM

## 2025-03-25 DIAGNOSIS — R73.03 PREDIABETES: ICD-10-CM

## 2025-03-25 LAB
ERYTHROCYTE [DISTWIDTH] IN BLOOD BY AUTOMATED COUNT: 13.4 % (ref 10–15)
EST. AVERAGE GLUCOSE BLD GHB EST-MCNC: 117 MG/DL
HBA1C MFR BLD: 5.7 % (ref 0–5.6)
HCT VFR BLD AUTO: 41.9 % (ref 40–53)
HGB BLD-MCNC: 13.5 G/DL (ref 13.3–17.7)
MCH RBC QN AUTO: 28.9 PG (ref 26.5–33)
MCHC RBC AUTO-ENTMCNC: 32.2 G/DL (ref 31.5–36.5)
MCV RBC AUTO: 90 FL (ref 78–100)
PLATELET # BLD AUTO: 223 10E3/UL (ref 150–450)
RBC # BLD AUTO: 4.67 10E6/UL (ref 4.4–5.9)
WBC # BLD AUTO: 5.8 10E3/UL (ref 4–11)

## 2025-03-25 PROCEDURE — 87086 URINE CULTURE/COLONY COUNT: CPT

## 2025-03-25 PROCEDURE — 83036 HEMOGLOBIN GLYCOSYLATED A1C: CPT

## 2025-03-26 LAB
ANION GAP SERPL CALCULATED.3IONS-SCNC: 11 MMOL/L (ref 7–15)
BACTERIA UR CULT: NO GROWTH
BUN SERPL-MCNC: 21.6 MG/DL (ref 8–23)
CALCIUM SERPL-MCNC: 9.7 MG/DL (ref 8.8–10.4)
CHLORIDE SERPL-SCNC: 105 MMOL/L (ref 98–107)
CREAT SERPL-MCNC: 1.14 MG/DL (ref 0.67–1.17)
EGFRCR SERPLBLD CKD-EPI 2021: 69 ML/MIN/1.73M2
GLUCOSE SERPL-MCNC: 104 MG/DL (ref 70–99)
HCO3 SERPL-SCNC: 26 MMOL/L (ref 22–29)
POTASSIUM SERPL-SCNC: 4.1 MMOL/L (ref 3.4–5.3)
SODIUM SERPL-SCNC: 142 MMOL/L (ref 135–145)

## 2025-03-26 NOTE — PHARMACY-ADMISSION MEDICATION HISTORY
Admission Medication History    Nurse Veronika Bañuelos RN Fri Mar 21, 2025  3:54 PM     PTA Med List   Medication Sig Last Dose/Taking    lisinopril (ZESTRIL) 10 MG tablet Take 1 tablet (10 mg) by mouth daily. Taking    sildenafil (VIAGRA) 100 MG tablet Take 1 tablet (100 mg) by mouth daily as needed (ED). Taking As Needed    simvastatin (ZOCOR) 20 MG tablet TAKE 1 TABLET BY MOUTH AT BEDTIME. Taking    tamsulosin (FLOMAX) 0.4 MG capsule Take 2 capsules (0.8 mg) by mouth daily. Taking

## 2025-03-31 ENCOUNTER — HOSPITAL ENCOUNTER (OUTPATIENT)
Facility: CLINIC | Age: 70
Discharge: HOME | End: 2025-04-01
Attending: STUDENT IN AN ORGANIZED HEALTH CARE EDUCATION/TRAINING PROGRAM | Admitting: STUDENT IN AN ORGANIZED HEALTH CARE EDUCATION/TRAINING PROGRAM
Payer: COMMERCIAL

## 2025-03-31 ENCOUNTER — ANESTHESIA EVENT (OUTPATIENT)
Dept: SURGERY | Facility: CLINIC | Age: 70
End: 2025-03-31
Payer: COMMERCIAL

## 2025-03-31 ENCOUNTER — ANESTHESIA (OUTPATIENT)
Dept: SURGERY | Facility: CLINIC | Age: 70
End: 2025-03-31
Payer: COMMERCIAL

## 2025-03-31 DIAGNOSIS — C61 PROSTATE CANCER (H): Primary | ICD-10-CM

## 2025-03-31 DIAGNOSIS — D49.59 PROSTATE NEOPLASM: ICD-10-CM

## 2025-03-31 PROCEDURE — 250N000011 HC RX IP 250 OP 636: Performed by: NURSE ANESTHETIST, CERTIFIED REGISTERED

## 2025-03-31 PROCEDURE — 250N000011 HC RX IP 250 OP 636: Performed by: STUDENT IN AN ORGANIZED HEALTH CARE EDUCATION/TRAINING PROGRAM

## 2025-03-31 PROCEDURE — 250N000011 HC RX IP 250 OP 636: Performed by: ANESTHESIOLOGY

## 2025-03-31 PROCEDURE — 258N000003 HC RX IP 258 OP 636: Performed by: NURSE ANESTHETIST, CERTIFIED REGISTERED

## 2025-03-31 PROCEDURE — 258N000003 HC RX IP 258 OP 636: Performed by: ANESTHESIOLOGY

## 2025-03-31 PROCEDURE — 88309 TISSUE EXAM BY PATHOLOGIST: CPT | Mod: TC | Performed by: STUDENT IN AN ORGANIZED HEALTH CARE EDUCATION/TRAINING PROGRAM

## 2025-03-31 PROCEDURE — 250N000009 HC RX 250: Performed by: NURSE ANESTHETIST, CERTIFIED REGISTERED

## 2025-03-31 PROCEDURE — 272N000001 HC OR GENERAL SUPPLY STERILE: Performed by: STUDENT IN AN ORGANIZED HEALTH CARE EDUCATION/TRAINING PROGRAM

## 2025-03-31 PROCEDURE — 360N000080 HC SURGERY LEVEL 7, PER MIN: Performed by: STUDENT IN AN ORGANIZED HEALTH CARE EDUCATION/TRAINING PROGRAM

## 2025-03-31 PROCEDURE — 370N000017 HC ANESTHESIA TECHNICAL FEE, PER MIN: Performed by: STUDENT IN AN ORGANIZED HEALTH CARE EDUCATION/TRAINING PROGRAM

## 2025-03-31 PROCEDURE — 258N000001 HC RX 258: Performed by: STUDENT IN AN ORGANIZED HEALTH CARE EDUCATION/TRAINING PROGRAM

## 2025-03-31 PROCEDURE — 88309 TISSUE EXAM BY PATHOLOGIST: CPT | Mod: 26 | Performed by: PATHOLOGY

## 2025-03-31 PROCEDURE — 250N000009 HC RX 250: Performed by: STUDENT IN AN ORGANIZED HEALTH CARE EDUCATION/TRAINING PROGRAM

## 2025-03-31 PROCEDURE — 250N000013 HC RX MED GY IP 250 OP 250 PS 637: Performed by: STUDENT IN AN ORGANIZED HEALTH CARE EDUCATION/TRAINING PROGRAM

## 2025-03-31 PROCEDURE — 55866 LAPS SURG PRST8ECT RPBIC RAD: CPT | Performed by: STUDENT IN AN ORGANIZED HEALTH CARE EDUCATION/TRAINING PROGRAM

## 2025-03-31 PROCEDURE — 999N000141 HC STATISTIC PRE-PROCEDURE NURSING ASSESSMENT: Performed by: STUDENT IN AN ORGANIZED HEALTH CARE EDUCATION/TRAINING PROGRAM

## 2025-03-31 PROCEDURE — 710N000009 HC RECOVERY PHASE 1, LEVEL 1, PER MIN: Performed by: STUDENT IN AN ORGANIZED HEALTH CARE EDUCATION/TRAINING PROGRAM

## 2025-03-31 PROCEDURE — 250N000025 HC SEVOFLURANE, PER MIN: Performed by: STUDENT IN AN ORGANIZED HEALTH CARE EDUCATION/TRAINING PROGRAM

## 2025-03-31 PROCEDURE — 250N000011 HC RX IP 250 OP 636: Mod: JZ | Performed by: NURSE ANESTHETIST, CERTIFIED REGISTERED

## 2025-03-31 RX ORDER — SODIUM CHLORIDE, SODIUM LACTATE, POTASSIUM CHLORIDE, CALCIUM CHLORIDE 600; 310; 30; 20 MG/100ML; MG/100ML; MG/100ML; MG/100ML
INJECTION, SOLUTION INTRAVENOUS CONTINUOUS
Status: DISCONTINUED | OUTPATIENT
Start: 2025-03-31 | End: 2025-03-31 | Stop reason: HOSPADM

## 2025-03-31 RX ORDER — EPHEDRINE SULFATE 50 MG/ML
INJECTION, SOLUTION INTRAMUSCULAR; INTRAVENOUS; SUBCUTANEOUS PRN
Status: DISCONTINUED | OUTPATIENT
Start: 2025-03-31 | End: 2025-03-31

## 2025-03-31 RX ORDER — CEFAZOLIN SODIUM/WATER 2 G/20 ML
2 SYRINGE (ML) INTRAVENOUS
Status: COMPLETED | OUTPATIENT
Start: 2025-03-31 | End: 2025-03-31

## 2025-03-31 RX ORDER — AMOXICILLIN 250 MG
1 CAPSULE ORAL 2 TIMES DAILY PRN
Qty: 30 TABLET | Refills: 0 | Status: SHIPPED | OUTPATIENT
Start: 2025-03-31

## 2025-03-31 RX ORDER — AMOXICILLIN 250 MG
1 CAPSULE ORAL 2 TIMES DAILY
Status: DISCONTINUED | OUTPATIENT
Start: 2025-03-31 | End: 2025-04-01 | Stop reason: HOSPADM

## 2025-03-31 RX ORDER — LATANOPROST 50 UG/ML
1 SOLUTION/ DROPS OPHTHALMIC DAILY
COMMUNITY

## 2025-03-31 RX ORDER — ONDANSETRON 2 MG/ML
4 INJECTION INTRAMUSCULAR; INTRAVENOUS EVERY 30 MIN PRN
Status: DISCONTINUED | OUTPATIENT
Start: 2025-03-31 | End: 2025-03-31 | Stop reason: HOSPADM

## 2025-03-31 RX ORDER — PROCHLORPERAZINE MALEATE 5 MG/1
5 TABLET ORAL EVERY 6 HOURS PRN
Status: DISCONTINUED | OUTPATIENT
Start: 2025-03-31 | End: 2025-04-01 | Stop reason: HOSPADM

## 2025-03-31 RX ORDER — FENTANYL CITRATE 50 UG/ML
50 INJECTION, SOLUTION INTRAMUSCULAR; INTRAVENOUS EVERY 5 MIN PRN
Status: DISCONTINUED | OUTPATIENT
Start: 2025-03-31 | End: 2025-03-31 | Stop reason: HOSPADM

## 2025-03-31 RX ORDER — PROPOFOL 10 MG/ML
INJECTION, EMULSION INTRAVENOUS CONTINUOUS PRN
Status: DISCONTINUED | OUTPATIENT
Start: 2025-03-31 | End: 2025-03-31

## 2025-03-31 RX ORDER — FENTANYL CITRATE 50 UG/ML
25 INJECTION, SOLUTION INTRAMUSCULAR; INTRAVENOUS EVERY 5 MIN PRN
Status: DISCONTINUED | OUTPATIENT
Start: 2025-03-31 | End: 2025-03-31 | Stop reason: HOSPADM

## 2025-03-31 RX ORDER — SIMETHICONE 80 MG
80 TABLET,CHEWABLE ORAL EVERY 6 HOURS PRN
Status: DISCONTINUED | OUTPATIENT
Start: 2025-03-31 | End: 2025-04-01 | Stop reason: HOSPADM

## 2025-03-31 RX ORDER — LIDOCAINE 40 MG/G
CREAM TOPICAL
Status: DISCONTINUED | OUTPATIENT
Start: 2025-03-31 | End: 2025-03-31 | Stop reason: HOSPADM

## 2025-03-31 RX ORDER — LATANOPROST 50 UG/ML
1 SOLUTION/ DROPS OPHTHALMIC AT BEDTIME
Status: DISCONTINUED | OUTPATIENT
Start: 2025-03-31 | End: 2025-04-01 | Stop reason: HOSPADM

## 2025-03-31 RX ORDER — SIMVASTATIN 10 MG
20 TABLET ORAL AT BEDTIME
Status: DISCONTINUED | OUTPATIENT
Start: 2025-04-01 | End: 2025-04-01 | Stop reason: HOSPADM

## 2025-03-31 RX ORDER — HYDROMORPHONE HCL IN WATER/PF 6 MG/30 ML
0.2 PATIENT CONTROLLED ANALGESIA SYRINGE INTRAVENOUS
Status: DISCONTINUED | OUTPATIENT
Start: 2025-03-31 | End: 2025-04-01 | Stop reason: HOSPADM

## 2025-03-31 RX ORDER — NALOXONE HYDROCHLORIDE 0.4 MG/ML
0.4 INJECTION, SOLUTION INTRAMUSCULAR; INTRAVENOUS; SUBCUTANEOUS
Status: DISCONTINUED | OUTPATIENT
Start: 2025-03-31 | End: 2025-04-01 | Stop reason: HOSPADM

## 2025-03-31 RX ORDER — NALOXONE HYDROCHLORIDE 0.4 MG/ML
0.2 INJECTION, SOLUTION INTRAMUSCULAR; INTRAVENOUS; SUBCUTANEOUS
Status: DISCONTINUED | OUTPATIENT
Start: 2025-03-31 | End: 2025-04-01 | Stop reason: HOSPADM

## 2025-03-31 RX ORDER — LISINOPRIL 10 MG/1
10 TABLET ORAL DAILY
Status: DISCONTINUED | OUTPATIENT
Start: 2025-04-01 | End: 2025-04-01 | Stop reason: HOSPADM

## 2025-03-31 RX ORDER — TOLTERODINE 4 MG/1
4 CAPSULE, EXTENDED RELEASE ORAL DAILY
Status: DISCONTINUED | OUTPATIENT
Start: 2025-03-31 | End: 2025-04-01 | Stop reason: HOSPADM

## 2025-03-31 RX ORDER — FAMOTIDINE 20 MG/1
20 TABLET, FILM COATED ORAL 2 TIMES DAILY PRN
Status: DISCONTINUED | OUTPATIENT
Start: 2025-03-31 | End: 2025-04-01 | Stop reason: HOSPADM

## 2025-03-31 RX ORDER — NALOXONE HYDROCHLORIDE 0.4 MG/ML
0.1 INJECTION, SOLUTION INTRAMUSCULAR; INTRAVENOUS; SUBCUTANEOUS
Status: DISCONTINUED | OUTPATIENT
Start: 2025-03-31 | End: 2025-03-31 | Stop reason: HOSPADM

## 2025-03-31 RX ORDER — HYDROMORPHONE HCL IN WATER/PF 6 MG/30 ML
0.4 PATIENT CONTROLLED ANALGESIA SYRINGE INTRAVENOUS
Status: DISCONTINUED | OUTPATIENT
Start: 2025-03-31 | End: 2025-04-01 | Stop reason: HOSPADM

## 2025-03-31 RX ORDER — HYDROXYZINE HYDROCHLORIDE 10 MG/1
10 TABLET, FILM COATED ORAL EVERY 6 HOURS PRN
Status: DISCONTINUED | OUTPATIENT
Start: 2025-03-31 | End: 2025-04-01 | Stop reason: HOSPADM

## 2025-03-31 RX ORDER — DEXAMETHASONE SODIUM PHOSPHATE 4 MG/ML
INJECTION, SOLUTION INTRA-ARTICULAR; INTRALESIONAL; INTRAMUSCULAR; INTRAVENOUS; SOFT TISSUE PRN
Status: DISCONTINUED | OUTPATIENT
Start: 2025-03-31 | End: 2025-03-31

## 2025-03-31 RX ORDER — PROPOFOL 10 MG/ML
INJECTION, EMULSION INTRAVENOUS PRN
Status: DISCONTINUED | OUTPATIENT
Start: 2025-03-31 | End: 2025-03-31

## 2025-03-31 RX ORDER — ACETAMINOPHEN 325 MG/1
975 TABLET ORAL EVERY 8 HOURS
Status: DISCONTINUED | OUTPATIENT
Start: 2025-03-31 | End: 2025-04-01 | Stop reason: HOSPADM

## 2025-03-31 RX ORDER — DEXAMETHASONE SODIUM PHOSPHATE 4 MG/ML
4 INJECTION, SOLUTION INTRA-ARTICULAR; INTRALESIONAL; INTRAMUSCULAR; INTRAVENOUS; SOFT TISSUE
Status: DISCONTINUED | OUTPATIENT
Start: 2025-03-31 | End: 2025-03-31 | Stop reason: HOSPADM

## 2025-03-31 RX ORDER — BUPIVACAINE HYDROCHLORIDE 2.5 MG/ML
INJECTION, SOLUTION INFILTRATION; PERINEURAL PRN
Status: DISCONTINUED | OUTPATIENT
Start: 2025-03-31 | End: 2025-03-31 | Stop reason: HOSPADM

## 2025-03-31 RX ORDER — ATROPA BELLADONNA AND OPIUM 16.2; 3 MG/1; MG/1
30 SUPPOSITORY RECTAL EVERY 12 HOURS PRN
Status: DISCONTINUED | OUTPATIENT
Start: 2025-03-31 | End: 2025-04-01 | Stop reason: HOSPADM

## 2025-03-31 RX ORDER — POLYETHYLENE GLYCOL 3350 17 G/17G
17 POWDER, FOR SOLUTION ORAL DAILY
Status: DISCONTINUED | OUTPATIENT
Start: 2025-04-01 | End: 2025-04-01 | Stop reason: HOSPADM

## 2025-03-31 RX ORDER — ONDANSETRON 4 MG/1
4 TABLET, ORALLY DISINTEGRATING ORAL EVERY 6 HOURS PRN
Status: DISCONTINUED | OUTPATIENT
Start: 2025-03-31 | End: 2025-04-01 | Stop reason: HOSPADM

## 2025-03-31 RX ORDER — HYDRALAZINE HYDROCHLORIDE 20 MG/ML
2.5-5 INJECTION INTRAMUSCULAR; INTRAVENOUS EVERY 10 MIN PRN
Status: DISCONTINUED | OUTPATIENT
Start: 2025-03-31 | End: 2025-03-31 | Stop reason: HOSPADM

## 2025-03-31 RX ORDER — OXYBUTYNIN CHLORIDE 15 MG/1
15 TABLET, EXTENDED RELEASE ORAL DAILY
Qty: 14 TABLET | Refills: 0 | Status: SHIPPED | OUTPATIENT
Start: 2025-03-31

## 2025-03-31 RX ORDER — CEFAZOLIN SODIUM/WATER 2 G/20 ML
2 SYRINGE (ML) INTRAVENOUS SEE ADMIN INSTRUCTIONS
Status: DISCONTINUED | OUTPATIENT
Start: 2025-03-31 | End: 2025-03-31 | Stop reason: HOSPADM

## 2025-03-31 RX ORDER — MEPERIDINE HYDROCHLORIDE 25 MG/ML
12.5 INJECTION INTRAMUSCULAR; INTRAVENOUS; SUBCUTANEOUS EVERY 5 MIN PRN
Status: DISCONTINUED | OUTPATIENT
Start: 2025-03-31 | End: 2025-03-31 | Stop reason: HOSPADM

## 2025-03-31 RX ORDER — ONDANSETRON 4 MG/1
4 TABLET, ORALLY DISINTEGRATING ORAL EVERY 30 MIN PRN
Status: DISCONTINUED | OUTPATIENT
Start: 2025-03-31 | End: 2025-03-31 | Stop reason: HOSPADM

## 2025-03-31 RX ORDER — ONDANSETRON 2 MG/ML
INJECTION INTRAMUSCULAR; INTRAVENOUS PRN
Status: DISCONTINUED | OUTPATIENT
Start: 2025-03-31 | End: 2025-03-31

## 2025-03-31 RX ORDER — CALCIUM CARBONATE 500 MG/1
500 TABLET, CHEWABLE ORAL 4 TIMES DAILY PRN
Status: DISCONTINUED | OUTPATIENT
Start: 2025-03-31 | End: 2025-04-01 | Stop reason: HOSPADM

## 2025-03-31 RX ORDER — LIDOCAINE 40 MG/G
CREAM TOPICAL
Status: DISCONTINUED | OUTPATIENT
Start: 2025-03-31 | End: 2025-04-01 | Stop reason: HOSPADM

## 2025-03-31 RX ORDER — HYDROMORPHONE HCL IN WATER/PF 6 MG/30 ML
0.4 PATIENT CONTROLLED ANALGESIA SYRINGE INTRAVENOUS EVERY 5 MIN PRN
Status: DISCONTINUED | OUTPATIENT
Start: 2025-03-31 | End: 2025-03-31 | Stop reason: HOSPADM

## 2025-03-31 RX ORDER — ACETAMINOPHEN 650 MG/1
650 SUPPOSITORY RECTAL ONCE
Status: COMPLETED | OUTPATIENT
Start: 2025-03-31 | End: 2025-03-31

## 2025-03-31 RX ORDER — FENTANYL CITRATE 50 UG/ML
INJECTION, SOLUTION INTRAMUSCULAR; INTRAVENOUS PRN
Status: DISCONTINUED | OUTPATIENT
Start: 2025-03-31 | End: 2025-03-31

## 2025-03-31 RX ORDER — SODIUM CHLORIDE, SODIUM LACTATE, POTASSIUM CHLORIDE, CALCIUM CHLORIDE 600; 310; 30; 20 MG/100ML; MG/100ML; MG/100ML; MG/100ML
INJECTION, SOLUTION INTRAVENOUS CONTINUOUS PRN
Status: DISCONTINUED | OUTPATIENT
Start: 2025-03-31 | End: 2025-03-31

## 2025-03-31 RX ORDER — OXYCODONE HYDROCHLORIDE 5 MG/1
5 TABLET ORAL EVERY 4 HOURS PRN
Status: DISCONTINUED | OUTPATIENT
Start: 2025-03-31 | End: 2025-04-01 | Stop reason: HOSPADM

## 2025-03-31 RX ORDER — KETOROLAC TROMETHAMINE 15 MG/ML
15 INJECTION, SOLUTION INTRAMUSCULAR; INTRAVENOUS
Status: DISCONTINUED | OUTPATIENT
Start: 2025-03-31 | End: 2025-03-31 | Stop reason: HOSPADM

## 2025-03-31 RX ORDER — ALBUTEROL SULFATE 0.83 MG/ML
2.5 SOLUTION RESPIRATORY (INHALATION) EVERY 4 HOURS PRN
Status: DISCONTINUED | OUTPATIENT
Start: 2025-03-31 | End: 2025-03-31 | Stop reason: HOSPADM

## 2025-03-31 RX ORDER — KETOROLAC TROMETHAMINE 15 MG/ML
15 INJECTION, SOLUTION INTRAMUSCULAR; INTRAVENOUS EVERY 6 HOURS
Status: COMPLETED | OUTPATIENT
Start: 2025-03-31 | End: 2025-04-01

## 2025-03-31 RX ORDER — LABETALOL HYDROCHLORIDE 5 MG/ML
5 INJECTION, SOLUTION INTRAVENOUS EVERY 5 MIN PRN
Status: DISCONTINUED | OUTPATIENT
Start: 2025-03-31 | End: 2025-03-31 | Stop reason: HOSPADM

## 2025-03-31 RX ORDER — OXYCODONE HYDROCHLORIDE 10 MG/1
10 TABLET ORAL EVERY 4 HOURS PRN
Status: DISCONTINUED | OUTPATIENT
Start: 2025-03-31 | End: 2025-04-01 | Stop reason: HOSPADM

## 2025-03-31 RX ORDER — ONDANSETRON 2 MG/ML
4 INJECTION INTRAMUSCULAR; INTRAVENOUS EVERY 6 HOURS PRN
Status: DISCONTINUED | OUTPATIENT
Start: 2025-03-31 | End: 2025-04-01 | Stop reason: HOSPADM

## 2025-03-31 RX ORDER — GINSENG 100 MG
CAPSULE ORAL 3 TIMES DAILY
Status: DISCONTINUED | OUTPATIENT
Start: 2025-03-31 | End: 2025-04-01 | Stop reason: HOSPADM

## 2025-03-31 RX ORDER — HYDROMORPHONE HCL IN WATER/PF 6 MG/30 ML
0.2 PATIENT CONTROLLED ANALGESIA SYRINGE INTRAVENOUS EVERY 5 MIN PRN
Status: DISCONTINUED | OUTPATIENT
Start: 2025-03-31 | End: 2025-03-31 | Stop reason: HOSPADM

## 2025-03-31 RX ORDER — BISACODYL 10 MG
10 SUPPOSITORY, RECTAL RECTAL DAILY PRN
Status: DISCONTINUED | OUTPATIENT
Start: 2025-04-03 | End: 2025-04-01 | Stop reason: HOSPADM

## 2025-03-31 RX ORDER — ACETAMINOPHEN 325 MG/1
975 TABLET ORAL ONCE
Status: COMPLETED | OUTPATIENT
Start: 2025-03-31 | End: 2025-03-31

## 2025-03-31 RX ADMIN — HYDROMORPHONE HYDROCHLORIDE 1 MG: 1 INJECTION, SOLUTION INTRAMUSCULAR; INTRAVENOUS; SUBCUTANEOUS at 12:43

## 2025-03-31 RX ADMIN — ACETAMINOPHEN 975 MG: 325 TABLET, FILM COATED ORAL at 10:57

## 2025-03-31 RX ADMIN — TOLTERODINE TARTRATE 4 MG: 4 CAPSULE, EXTENDED RELEASE ORAL at 19:25

## 2025-03-31 RX ADMIN — SODIUM CHLORIDE, SODIUM LACTATE, POTASSIUM CHLORIDE, AND CALCIUM CHLORIDE: .6; .31; .03; .02 INJECTION, SOLUTION INTRAVENOUS at 12:27

## 2025-03-31 RX ADMIN — DEXAMETHASONE SODIUM PHOSPHATE 8 MG: 4 INJECTION, SOLUTION INTRA-ARTICULAR; INTRALESIONAL; INTRAMUSCULAR; INTRAVENOUS; SOFT TISSUE at 12:14

## 2025-03-31 RX ADMIN — EPHEDRINE SULFATE 5 MG: 5 INJECTION INTRAVENOUS at 14:02

## 2025-03-31 RX ADMIN — SENNOSIDES AND DOCUSATE SODIUM 1 TABLET: 50; 8.6 TABLET ORAL at 21:37

## 2025-03-31 RX ADMIN — ONDANSETRON 4 MG: 2 INJECTION INTRAMUSCULAR; INTRAVENOUS at 14:51

## 2025-03-31 RX ADMIN — SODIUM CHLORIDE, SODIUM LACTATE, POTASSIUM CHLORIDE, AND CALCIUM CHLORIDE: .6; .31; .03; .02 INJECTION, SOLUTION INTRAVENOUS at 12:03

## 2025-03-31 RX ADMIN — SUGAMMADEX 150 MG: 100 INJECTION, SOLUTION INTRAVENOUS at 15:34

## 2025-03-31 RX ADMIN — LABETALOL HYDROCHLORIDE 5 MG: 5 INJECTION, SOLUTION INTRAVENOUS at 16:13

## 2025-03-31 RX ADMIN — PROPOFOL 200 MG: 10 INJECTION, EMULSION INTRAVENOUS at 12:15

## 2025-03-31 RX ADMIN — ROCURONIUM BROMIDE 10 MG: 50 INJECTION, SOLUTION INTRAVENOUS at 13:29

## 2025-03-31 RX ADMIN — EPHEDRINE SULFATE 5 MG: 5 INJECTION INTRAVENOUS at 12:40

## 2025-03-31 RX ADMIN — EPHEDRINE SULFATE 10 MG: 5 INJECTION INTRAVENOUS at 12:35

## 2025-03-31 RX ADMIN — FENTANYL CITRATE 100 MCG: 50 INJECTION INTRAMUSCULAR; INTRAVENOUS at 12:10

## 2025-03-31 RX ADMIN — Medication 2 G: at 12:06

## 2025-03-31 RX ADMIN — ROCURONIUM BROMIDE 50 MG: 50 INJECTION, SOLUTION INTRAVENOUS at 12:16

## 2025-03-31 RX ADMIN — LATANOPROST 1 DROP: 50 SOLUTION OPHTHALMIC at 21:37

## 2025-03-31 RX ADMIN — KETOROLAC TROMETHAMINE 15 MG: 15 INJECTION, SOLUTION INTRAMUSCULAR; INTRAVENOUS at 19:27

## 2025-03-31 RX ADMIN — EPHEDRINE SULFATE 5 MG: 5 INJECTION INTRAVENOUS at 14:10

## 2025-03-31 RX ADMIN — BACITRACIN: 500 OINTMENT TOPICAL at 21:37

## 2025-03-31 RX ADMIN — PROPOFOL 75 MCG/KG/MIN: 10 INJECTION, EMULSION INTRAVENOUS at 12:15

## 2025-03-31 RX ADMIN — ACETAMINOPHEN 975 MG: 325 TABLET, FILM COATED ORAL at 19:25

## 2025-03-31 RX ADMIN — ROCURONIUM BROMIDE 10 MG: 50 INJECTION, SOLUTION INTRAVENOUS at 12:54

## 2025-03-31 RX ADMIN — ROCURONIUM BROMIDE 10 MG: 50 INJECTION, SOLUTION INTRAVENOUS at 14:31

## 2025-03-31 RX ADMIN — ROCURONIUM BROMIDE 10 MG: 50 INJECTION, SOLUTION INTRAVENOUS at 14:06

## 2025-03-31 RX ADMIN — SODIUM CHLORIDE, SODIUM LACTATE, POTASSIUM CHLORIDE, AND CALCIUM CHLORIDE: .6; .31; .03; .02 INJECTION, SOLUTION INTRAVENOUS at 11:14

## 2025-03-31 RX ADMIN — PHENYLEPHRINE HYDROCHLORIDE 100 MCG: 10 INJECTION INTRAVENOUS at 12:24

## 2025-03-31 RX ADMIN — PHENYLEPHRINE HYDROCHLORIDE 100 MCG: 10 INJECTION INTRAVENOUS at 12:30

## 2025-03-31 RX ADMIN — MIDAZOLAM 2 MG: 1 INJECTION INTRAMUSCULAR; INTRAVENOUS at 12:08

## 2025-03-31 RX ADMIN — PHENYLEPHRINE HYDROCHLORIDE 100 MCG: 10 INJECTION INTRAVENOUS at 12:28

## 2025-03-31 RX ADMIN — HYDROMORPHONE HYDROCHLORIDE 1 MG: 1 INJECTION, SOLUTION INTRAMUSCULAR; INTRAVENOUS; SUBCUTANEOUS at 15:32

## 2025-03-31 RX ADMIN — OXYCODONE HYDROCHLORIDE 5 MG: 5 TABLET ORAL at 23:35

## 2025-03-31 ASSESSMENT — ACTIVITIES OF DAILY LIVING (ADL)
ADLS_ACUITY_SCORE: 15
ADLS_ACUITY_SCORE: 17
ADLS_ACUITY_SCORE: 15
ADLS_ACUITY_SCORE: 15
ADLS_ACUITY_SCORE: 17
ADLS_ACUITY_SCORE: 29
ADLS_ACUITY_SCORE: 15
ADLS_ACUITY_SCORE: 17
ADLS_ACUITY_SCORE: 15
ADLS_ACUITY_SCORE: 15

## 2025-03-31 ASSESSMENT — LIFESTYLE VARIABLES: TOBACCO_USE: 1

## 2025-03-31 NOTE — ANESTHESIA POSTPROCEDURE EVALUATION
Patient: Guillermo Strauss    Procedure: Procedure(s):  PROSTATECTOMY, ROBOT-ASSISTED, USING DA SALINAS XI, URETHRAL DILATION       Anesthesia Type:  General    Note:  Disposition: Admission   Postop Pain Control: Uneventful            Sign Out: Well controlled pain   PONV: No   Neuro/Psych: Uneventful            Sign Out: Acceptable/Baseline neuro status   Airway/Respiratory: Uneventful            Sign Out: Acceptable/Baseline resp. status   CV/Hemodynamics: Uneventful            Sign Out: Acceptable CV status; No obvious hypovolemia; No obvious fluid overload   Other NRE: NONE   DID A NON-ROUTINE EVENT OCCUR? No           Last vitals:  Vitals Value Taken Time   /96 03/31/25 1637   Temp 97.16  F (36.2  C) 03/31/25 1649   Pulse 74 03/31/25 1649   Resp 0 03/31/25 1649   SpO2 96 % 03/31/25 1649   Vitals shown include unfiled device data.    Electronically Signed By: Mitchel Sosa MD  March 31, 2025  4:50 PM

## 2025-03-31 NOTE — DISCHARGE INSTRUCTIONS
Robotic Assisted Laparoscopic Prostatectomy (RALP)    Activity  - No strenuous exercise for 6 weeks.  - No lifting, pushing, pulling more than 10 pounds for 6 weeks.   - Do not strain with bowel movements.  - Do not drive until you can press the brake pedal quickly and fully without pain.   - Do not operate a motor vehicle while taking narcotic pain medications.     Diet  You may resume your regular post-procedure diet  Many patients do not regain their full appetite for several weeks after surgery  Take it slow, eating small meals frequently  If you notice you are passing less gas or feeling bloated, stop eating solid foods and stick to liquids for the next several hours until you begin to pass gas again.  You are not required to have a bowel movement prior to leaving the hospital. Some patients take several days for bowel movements to return due to anesthesia and pain medications.     Incisions  - You may shower and get incisions wet starting 48 hrs after surgery.  - Do not scrub incisions or submerge wounds for 2 weeks or until seen in follow-up.   - Remove wound dressing 48 hours after surgery.   - If purple dermabond glue was used, avoid applying any lotions or ointments.   - If steri-strips were used, they will fall off on their own.   - Leave incision open to air. Cover with gauze only if needed for comfort or to protect clothing from drainage.   - The stitches do not need to be removed, they will dissolve on their own.    Catheter Care:  You will be discharged home with a urinary catheter in place. Your nurse will instruct you on how to care for this at home.  Empty the bag into the toilet several times daily   Keep the catheter secured to your thigh using the securement device. Ensure the tubing doesn t become snagged or tugged.  You may shower normally and allow soapy water to run over the catheter  Sometimes people notice build-up at the insertion site of the catheter to the urethra. You may gently wipe  this away with a towelette or washcloth.     Common catheter issues:  Urine leaking around the catheter. This is a common finding in patients with a catheter and is usually no cause for alarm. Typically, it is due to bladder spasms. Bladder spasms occur because your bladder is not used to a forgein object and tries to  squeeze  this out, releasing urine around the catheter. Sometimes patients can experience abdominal cramping. Typically, bladder spasms resolve after a few days. If you find bladder spasms particularly bothersome, ask your doctor about medication to calm these.   Occasionally, urine leaking from around that catheter can be due to a blockage of the catheter- especially if your urine appears bloody. If the urine does not appear to be draining through the tube and your bladder feels full, please contact our department.  The feeling of needing to urinate. Some patients feel the persistent need to urinate with the catheter in place. As long as the catheter is still draining, this is likely due to a  forgein object sensation  in your bladder. Even though your bladder is empty of urine, the catheter balloon gives a false sensation that your bladder is full. This typically subsides after 24-48 hours but there are medications that may be able to help with this if the feeling is too bothersome.   Catheter sliding in and out. There is a balloon filled with water inside your bladder to prevent the catheter from slipping out. The tubing is quite long and may slide back and forth freely. If the catheter does slide completely out, save the catheter and call our department immediately.   You may use Vaseline at the tip of the penis to prevent chafing  If you need extra catheter supplies (leg bags, securement devices), call the clinic during business hours to arrange for this.     Medications  - Transition from narcotic pain medications to tylenol (acetaminophen) as you are able.  Wean yourself off all pain  "medications as you are able.  - Some pain medications contain both tylenol (acetaminophen) and a narcotic (Norco, vicodin, percocet), do not take more than 4,000mg of Tylenol (acetaminophen) from all sources in any 24 hour period.  - Narcotics can make you constipated.  Take over the counter fiber (metamucil or benefiber) and stool softeners (miralax, docusate or senna) while taking narcotic pain medications, but stop if you develop diarrhea.  - No driving or operating machinery while taking narcotic pain medications     Follow-Up:  - Follow up with Urology on 04/14  - Call or return sooner than your regularly scheduled visit if you develop any of the following: fever (greater than 101.5), uncontrolled pain, uncontrolled nausea or vomiting, or inability to urinate.    Phone numbers:   - Monday through Friday 8am to 4:30pm: Call [phone] with questions, requests for medication refills, or to schedule or confirm an appointment.  - Nights or weekends: call the after hours emergency pager - [phone] and tell the  \"I would like to page the Urology Resident on call.\" Please note, due to prescribing laws, resident physicians are unable to prescribe narcotics after-hours. If you feel as though you will need a refill of a narcotic pain medication, you will need to call the clinic during business hours OR seek emergency care.  - For emergencies, call [phone]    "

## 2025-03-31 NOTE — OP NOTE
PREPROCEDURE DIAGNOSIS: Prostate cancer.   POSTPROCEDURE DIAGNOSIS: Prostate cancer.   PROCEDURES PERFORMED:  1.  Urethral meatal dilatation  2.  Robotic radical prostatectomy  SURGEON: Kervni Traylor MD   ASSISTANT: Russ Saeed MD   SECOND ASSISTANT: Sultana Montano, surgical tech  ANESTHESIA: General with endotracheal intubation.   INDICATIONS FOR PROCEDURE: Guillermo Strauss is a 69 year old year-old gentleman with a history of  [place] 7 prostate cancer. He has been counseled regarding treatment options and presents to undergo surgery.   Findings: Enlarged prostate prominent median lobe, nerve sparing done on the left side  Specimens:  Prostate, seminal vesicles, B ilateral lymph nodes  DESCRIPTION OF PROCEDURE: After fully informed voluntary consent was obtained, the patient was brought into the operating room, properly identified and placed in a dorsal lithotomy position on the table. General anesthesia was induced, endotracheal intubation performed, IV Ancef administered. The patient was then positioned appropriately on the table and all pressure points were padded and he was secured to the table with tape. Once the positioning was performed, we proceeded with shaving, prepping and draping the abdomen in the usual sterile fashion with Betadine. Rendon was placed in the bladder in a sterile manner on the field. A midline incision about 5 cm above the umbilicus was made and a Veress needle introduced into the abdomen through this incision. Once the abdomen was accessed, it was confirmed using a saline drop test and the abdomen was insufflated. Once the abdomen was completely insufflated, additional robotic ports, 1 on the left and 2 on the right, were placed and a left sided assistant port between the right-sided robot port and main camera were placed.  There were some significant adhesions on the right lateral abdominal wall and we had to do lysis of adhesions for the ports to be placed.  Robot was then docked  and surgery was commenced. The sigmoid was adherent to the lateral pelvic sidewall and these adhesions were taken down sharply. We then made a transverse incision posterior to the seminal vesicles and mobilized the rectum away from the posterior aspect of the prostate. The vas deferens on either side were identified and divided with electrocautery and the seminal vesicles were carefully dissected out with the vasculature being controlled using hemolock clips. Once seminal vesicle and vas deferens were dissected all the way down to the prostate, attention was turned to bringing the bladder down off the anterior wall of the abdomen. Incisions were made parallel to the urachus on either side and carried all the way up to the umbilicus and the urachus was divided horizontally. We then dropped down the bladder from the anterior abdominal wall and the rest of the bladder was mobilized away from the pelvic sidewalls on both sides.     We then transversely incised the anterior bladder neck at the base of the prostate until we were able to incise the anterior urethra and identify the Rendon catheter. The Rendon was then pulled out through this opening in the urethra to give upward traction on the prostate.  There was a significant sized median lobe making it difficult to identify the posterior bladder neck and therefore an 0 Vicryl figure of 8 suture was placed to help with the traction and proper identification of the bladder neck.  Posterior bladder neck was then divided.  The prostatic pedicles were then divided using Hem-o-elsy clips and the neurovascular bundles were then  off posterolaterally on both sides all the way to the apex and also  posteriorly off the prostate. We then divided the urethra distal to the prostatic apex after carefully dissecting out the apex and taking care to ensure there was no capsular incision.  We were able to perform nerve sparing on the left side and in view of the  predominant cancer on the right side did not perform nerve sparing dissection.  Once the prostate was completely resected, it was set aside in the left pericolic gutter. We then carefully inspected for hemostasis.  The dorsal vein complex was oversewn using a 3.0 Stratafix suture.   We then completed our vesicourethral anastomosis in a standard Van Velthoven fashion using two 3-0 V Lockl sutures, the tail ends of which had been tied together. The first suture was taken at the 5 o'clock position through the bladder neck and taken through the posterior urethral plate and up the left side of the urethra and bladder to the 11 o'clock position. We then brought the right sided anastomotic suture through the urethra and up the right side of the urethra and bladder to the midline. The sutures were then tied down. A final Rendon catheter was then placed and the bladder irrigated.  We tested the bladder by instilling 120cc of saline and found it to be leak free.   An EndoCatch bag was used to retrieve the specimen and a 19 Victor Manuel drain was placed in the pelvis.  Surgicel powder was placed for hemostasis.  We then undocked the robot and removed all the ports. Midline camera port incision was enlarged and the specimen retrieved through this incision. The fascia was reapproximated using interrupted figure-of-eight 0 Vicryl sutures on UR-6 needle. The skin of all the incisions was closed using running subcuticular closure with 3-0 Monocryl. The patient tolerated the procedure well. There were no complications. Blood loss was 400 mL. All sponge, needle and instrument counts were correct and doubly verified prior to closure. I was present and involved in the entire procedure.   Kervin Traylor MD

## 2025-03-31 NOTE — ANESTHESIA PREPROCEDURE EVALUATION
Anesthesia Pre-Procedure Evaluation    Patient: Guillermo Strauss   MRN: [id number] : 1955        Procedure : Procedure(s):  PROSTATECTOMY, ROBOT-ASSISTED, USING DA SALINAS XI, WITH  Possible bilateral PELVIC LYMPHADENECTOMY          Past Medical History:   Diagnosis Date    Benign essential hypertension     Other and unspecified hyperlipidemia     Preglaucoma, unspecified     follows at [facility]      Past Surgical History:   Procedure Laterality Date    COLONOSCOPY      No polyps    HERNIA REPAIR, UMBILICAL  2011    MR PROSTATE BIOPSY Bilateral 2025      Allergies   Allergen Reactions    No Known Allergies       Social History     Tobacco Use    Smoking status: Former     Current packs/day: 0.00     Types: Cigarettes     Quit date:      Years since quittin.2    Smokeless tobacco: Never    Tobacco comments:     quit in about  after about 30 pack years   Substance Use Topics    Alcohol use: Yes     Comment: occ      Wt Readings from Last 1 Encounters:   25 81.6 kg (180 lb)        Anesthesia Evaluation   Pt has had prior anesthetic.     No history of anesthetic complications       ROS/MED HX  ENT/Pulmonary:     (+)                tobacco use, Past use,                       Neurologic:       Cardiovascular:     (+) Dyslipidemia hypertension- -   -  - -                                      METS/Exercise Tolerance:     Hematologic:       Musculoskeletal:       GI/Hepatic:     (+) GERD, Asymptomatic on medication,                  Renal/Genitourinary:       Endo:       Psychiatric/Substance Use:     (+)     Recreational drug usage: Cannabis (intermittant gummies).    Infectious Disease:       Malignancy:       Other:            Physical Exam    Airway        Mallampati: II   TM distance: > 3 FB   Neck ROM: full   Mouth opening: > 3 cm    Respiratory Devices and Support         Dental       (+) Minor Abnormalities - some fillings, tiny chips      Cardiovascular             Pulmonary   "                 OUTSIDE LABS:  CBC:   Lab Results   Component Value Date    WBC 5.8 03/25/2025    WBC 5.3 12/19/2024    HGB 13.5 03/25/2025    HGB 13.8 12/19/2024    HCT 41.9 03/25/2025    HCT 42.8 12/19/2024     03/25/2025     12/19/2024     BMP:   Lab Results   Component Value Date     03/25/2025     12/19/2024    POTASSIUM 4.1 03/25/2025    POTASSIUM 4.4 12/19/2024    CHLORIDE 105 03/25/2025    CHLORIDE 107 12/19/2024    CO2 26 03/25/2025    CO2 24 12/19/2024    BUN 21.6 03/25/2025    BUN 17.0 12/19/2024    CR 1.14 03/25/2025    CR 1.2 12/19/2024     (H) 03/25/2025     (H) 12/19/2024     COAGS: No results found for: \"PTT\", \"INR\", \"FIBR\"  POC: No results found for: \"BGM\", \"HCG\", \"HCGS\"  HEPATIC:   Lab Results   Component Value Date    ALBUMIN 4.4 12/20/2023    PROTTOTAL 7.1 12/20/2023    ALT 21 12/20/2023    AST 25 12/20/2023    ALKPHOS 77 12/20/2023    BILITOTAL 0.5 12/20/2023     OTHER:   Lab Results   Component Value Date    A1C 5.7 (H) 03/25/2025    OMAR 9.7 03/25/2025    TSH 1.32 04/03/2013    CRP 0.04 05/09/2003       Anesthesia Plan    ASA Status:  2    NPO Status:  NPO Appropriate    Anesthesia Type: General.     - Airway: ETT   Induction: Intravenous, Propofol.   Maintenance: Balanced.        Consents    Anesthesia Plan(s) and associated risks, benefits, and realistic alternatives discussed. Questions answered and patient/representative(s) expressed understanding.     - Discussed:     - Discussed with:  Patient            Postoperative Care    Pain management: IV analgesics, Oral pain medications, Multi-modal analgesia.   PONV prophylaxis: Ondansetron (or other 5HT-3), Dexamethasone or Solumedrol     Comments:               Charly Galindo MD    Clinically Significant Risk Factors Present on Admission                 # Drug Induced Platelet Defect: home medication list includes an antiplatelet medication   # Hypertension: Noted on problem list           # " "Overweight: Estimated body mass index is 29.05 kg/m  as calculated from the following:    Height as of this encounter: 1.676 m (5' 6\").    Weight as of this encounter: 81.6 kg (180 lb).                "

## 2025-03-31 NOTE — BRIEF OP NOTE
[facility]    Brief Operative Note    Pre-operative diagnosis: Prostate cancer (H) [C61]  Post-operative diagnosis Same as pre-operative diagnosis    Procedure: PROSTATECTOMY, ROBOT-ASSISTED, USING DA SALINAS XI, URETHRAL DILATION, Bilateral - Abdomen    Surgeon: Surgeons and Role:     * Kervin Traylor MD - Primary  Anesthesia: General   Estimated Blood Loss: 400 ml    Drains: Peter-Batres and Rendon  Specimens:   ID Type Source Tests Collected by Time Destination   1 : Prostate and Seminal Vesicles Tissue Prostate SURGICAL PATHOLOGY EXAM Kervin Traylor MD 3/31/2025  2:59 PM      Findings:   None.  Complications: None.  Implants: * No implants in log *        - Admit overnight, discharge once tolerating regular diet, ambulatory, pain controlled  - ARTI out tomorrow if outputs are low  - Rendon until 04/14    Russ Saeed MD  Urology PGY-5

## 2025-03-31 NOTE — ANESTHESIA PROCEDURE NOTES
Airway       Patient location during procedure: OR       Procedure Start/Stop Times: 3/31/2025 12:18 PM  Staff -        CRNA: Sammi Miller APRN CRNA       Performed By: CRNA  Consent for Airway        Urgency: elective  Indications and Patient Condition       Indications for airway management: charles-procedural and airway protection       Induction type:intravenous       Mask difficulty assessment: 1 - vent by mask    Final Airway Details       Final airway type: endotracheal airway       Successful airway: ETT - single  Endotracheal Airway Details        ETT size (mm): 8.0       Cuffed: yes       Successful intubation technique: direct laryngoscopy       DL Blade Type: Pineda 2       Grade View of Cords: 1       Adjucts: stylet       Position: Right       Measured from: lips       Secured at (cm): 23       Bite block used: None    Post intubation assessment        Placement verified by: capnometry, equal breath sounds and chest rise        Number of attempts at approach: 1       Secured with: tape       Ease of procedure: easy       Dentition: Intact    Medication(s) Administered   Medication Administration Time: 3/31/2025 12:18 PM

## 2025-04-01 VITALS
SYSTOLIC BLOOD PRESSURE: 123 MMHG | RESPIRATION RATE: 13 BRPM | HEIGHT: 66 IN | DIASTOLIC BLOOD PRESSURE: 61 MMHG | HEART RATE: 86 BPM | OXYGEN SATURATION: 94 % | WEIGHT: 180 LBS | BODY MASS INDEX: 28.93 KG/M2 | TEMPERATURE: 99.2 F

## 2025-04-01 LAB
ANION GAP SERPL CALCULATED.3IONS-SCNC: 13 MMOL/L (ref 7–15)
BUN SERPL-MCNC: 17.4 MG/DL (ref 8–23)
CALCIUM SERPL-MCNC: 8.7 MG/DL (ref 8.8–10.4)
CHLORIDE SERPL-SCNC: 103 MMOL/L (ref 98–107)
CREAT SERPL-MCNC: 1.22 MG/DL (ref 0.67–1.17)
EGFRCR SERPLBLD CKD-EPI 2021: 64 ML/MIN/1.73M2
ERYTHROCYTE [DISTWIDTH] IN BLOOD BY AUTOMATED COUNT: 13.4 % (ref 10–15)
GLUCOSE SERPL-MCNC: 123 MG/DL (ref 70–99)
HCO3 SERPL-SCNC: 22 MMOL/L (ref 22–29)
HCT VFR BLD AUTO: 31.8 % (ref 40–53)
HGB BLD-MCNC: 10.4 G/DL (ref 13.3–17.7)
MCH RBC QN AUTO: 29 PG (ref 26.5–33)
MCHC RBC AUTO-ENTMCNC: 32.7 G/DL (ref 31.5–36.5)
MCV RBC AUTO: 89 FL (ref 78–100)
PLATELET # BLD AUTO: 182 10E3/UL (ref 150–450)
POTASSIUM SERPL-SCNC: 4.6 MMOL/L (ref 3.4–5.3)
RBC # BLD AUTO: 3.59 10E6/UL (ref 4.4–5.9)
SODIUM SERPL-SCNC: 138 MMOL/L (ref 135–145)
WBC # BLD AUTO: 8.6 10E3/UL (ref 4–11)

## 2025-04-01 PROCEDURE — 250N000011 HC RX IP 250 OP 636: Performed by: STUDENT IN AN ORGANIZED HEALTH CARE EDUCATION/TRAINING PROGRAM

## 2025-04-01 PROCEDURE — 250N000013 HC RX MED GY IP 250 OP 250 PS 637: Performed by: STUDENT IN AN ORGANIZED HEALTH CARE EDUCATION/TRAINING PROGRAM

## 2025-04-01 PROCEDURE — 80048 BASIC METABOLIC PNL TOTAL CA: CPT | Performed by: STUDENT IN AN ORGANIZED HEALTH CARE EDUCATION/TRAINING PROGRAM

## 2025-04-01 PROCEDURE — 36415 COLL VENOUS BLD VENIPUNCTURE: CPT | Performed by: STUDENT IN AN ORGANIZED HEALTH CARE EDUCATION/TRAINING PROGRAM

## 2025-04-01 PROCEDURE — 85018 HEMOGLOBIN: CPT | Performed by: STUDENT IN AN ORGANIZED HEALTH CARE EDUCATION/TRAINING PROGRAM

## 2025-04-01 PROCEDURE — 99024 POSTOP FOLLOW-UP VISIT: CPT | Performed by: PHYSICIAN ASSISTANT

## 2025-04-01 RX ORDER — ONDANSETRON 4 MG/1
4 TABLET, ORALLY DISINTEGRATING ORAL EVERY 8 HOURS PRN
Qty: 8 TABLET | Refills: 0 | Status: SHIPPED | OUTPATIENT
Start: 2025-04-01

## 2025-04-01 RX ORDER — OXYCODONE HYDROCHLORIDE 5 MG/1
5 TABLET ORAL EVERY 6 HOURS PRN
Qty: 12 TABLET | Refills: 0 | Status: SHIPPED | OUTPATIENT
Start: 2025-04-01 | End: 2025-04-04

## 2025-04-01 RX ADMIN — TOLTERODINE TARTRATE 4 MG: 4 CAPSULE, EXTENDED RELEASE ORAL at 08:00

## 2025-04-01 RX ADMIN — ACETAMINOPHEN 975 MG: 325 TABLET, FILM COATED ORAL at 03:38

## 2025-04-01 RX ADMIN — LISINOPRIL 10 MG: 10 TABLET ORAL at 08:00

## 2025-04-01 RX ADMIN — SENNOSIDES AND DOCUSATE SODIUM 1 TABLET: 50; 8.6 TABLET ORAL at 08:00

## 2025-04-01 RX ADMIN — KETOROLAC TROMETHAMINE 15 MG: 15 INJECTION, SOLUTION INTRAMUSCULAR; INTRAVENOUS at 06:57

## 2025-04-01 RX ADMIN — ACETAMINOPHEN 975 MG: 325 TABLET, FILM COATED ORAL at 10:25

## 2025-04-01 RX ADMIN — OXYCODONE HYDROCHLORIDE 5 MG: 5 TABLET ORAL at 09:30

## 2025-04-01 RX ADMIN — SIMETHICONE 80 MG: 80 TABLET, CHEWABLE ORAL at 03:43

## 2025-04-01 RX ADMIN — POLYETHYLENE GLYCOL 3350 17 G: 17 POWDER, FOR SOLUTION ORAL at 08:00

## 2025-04-01 RX ADMIN — BACITRACIN: 500 OINTMENT TOPICAL at 08:02

## 2025-04-01 RX ADMIN — KETOROLAC TROMETHAMINE 15 MG: 15 INJECTION, SOLUTION INTRAMUSCULAR; INTRAVENOUS at 01:30

## 2025-04-01 ASSESSMENT — ACTIVITIES OF DAILY LIVING (ADL)
ADLS_ACUITY_SCORE: 29
ADLS_ACUITY_SCORE: 30
ADLS_ACUITY_SCORE: 30
ADLS_ACUITY_SCORE: 29
ADLS_ACUITY_SCORE: 30
ADLS_ACUITY_SCORE: 29
ADLS_ACUITY_SCORE: 30
ADLS_ACUITY_SCORE: 29

## 2025-04-01 NOTE — PROGRESS NOTES
[facility] Urology Progress Note          Assessment and Plan:     Assessment:    POD 1 Urethral meatal dilatation, robotic radical prostatectomy    Prostate neoplasm    HLD      Plan:   -Ambulate.  -Patient will discharge home with Rendon catheter in place with outpatient removal scheduled for 04/14/25.  -Rendon catheter teaching.  -Advance diet as tolerated.  -ARTI drain removal prior to discharge.  -Anticipate likely discharge home this afternoon.    Natalya Schrader PA-C   [facility]  [phone]               Interval History:     Doing well.  Passing gas.  No bowel movement.  Belly has softened. Denies N/V/F/C.  soft, appropriately tender, incisions C/D/I, ARTI drain with bloody serosanguineous fluid, 145 mL. Rendon catheter in place draining clear yudith urine.  Has not ambulated yet.  Tmax 99.2.  Fattening 1.22 EGFR 64.  Hemoglobin 10.4.              Review of Systems:     The 5 point Review of Systems is negative other than noted in the HPI             Medications:     Current Facility-Administered Medications   Medication Dose Route Frequency Provider Last Rate Last Admin    acetaminophen (TYLENOL) tablet 975 mg  975 mg Oral Q8H Kervin Traylor MD   975 mg at 04/01/25 1025    bacitracin ointment   Topical TID Kervin Traylor MD   Given at 04/01/25 0802    benzocaine-menthol (CHLORASEPTIC) 6-10 MG lozenge 1-2 lozenge  1-2 lozenge Buccal Q1H PRN Kervin Traylor MD        [START ON 4/3/2025] bisacodyl (DULCOLAX) suppository 10 mg  10 mg Rectal Daily PRN Kervin Traylor MD        calcium carbonate (TUMS) chewable tablet 500 mg  500 mg Oral 4x Daily PRN Kervin Traylor MD        famotidine (PEPCID) tablet 20 mg  20 mg Oral BID PRN Kervin Traylor MD        HYDROmorphone (DILAUDID) injection 0.2 mg  0.2 mg Intravenous Q2H PRN Kervin Traylor MD        Or    HYDROmorphone (DILAUDID) injection 0.4 mg  0.4 mg Intravenous Q2H PRKervin Hendrix MD         hydrOXYzine HCl (ATARAX) tablet 10 mg  10 mg Oral Q6H PRN Kervin Traylor MD        ketorolac (TORADOL) injection 15 mg  15 mg Intravenous Q6H Kervin Traylor MD   15 mg at 04/01/25 0657    latanoprost (XALATAN) 0.005 % ophthalmic solution 1 drop  1 drop Both Eyes At Bedtime Kervin Traylor MD   1 drop at 03/31/25 2137    lidocaine (LMX4) cream   Topical Q1H PRN Kervin Traylor MD        lidocaine 1 % 0.1-1 mL  0.1-1 mL Other Q1H PRN Kervin Traylor MD        lisinopril (ZESTRIL) tablet 10 mg  10 mg Oral Daily Kervin Traylor MD   10 mg at 04/01/25 0800    [START ON 4/2/2025] magnesium hydroxide (MILK OF MAGNESIA) suspension 30 mL  30 mL Oral Daily PRN Kervin Traylor MD        melatonin tablet 5 mg  5 mg Oral At Bedtime PRN Kervin Traylor MD        naloxone (NARCAN) injection 0.2 mg  0.2 mg Intravenous Q2 Min PRN Kervin Traylor MD        Or    naloxone (NARCAN) injection 0.4 mg  0.4 mg Intravenous Q2 Min PRN Kervin Traylor MD        Or    naloxone (NARCAN) injection 0.2 mg  0.2 mg Intramuscular Q2 Min PRN Kervin Traylor MD        Or    naloxone (NARCAN) injection 0.4 mg  0.4 mg Intramuscular Q2 Min PRN Kervin Traylor MD        ondansetron (ZOFRAN ODT) ODT tab 4 mg  4 mg Oral Q6H PRN Kervin Traylor MD        Or    ondansetron (ZOFRAN) injection 4 mg  4 mg Intravenous Q6H PRN Kervin Traylor MD        opium-belladonna (B&O SUPPRETTES) 30-16.2 MG per suppository 1 suppository  30 mg Rectal Q12H PRN Kervin Traylor MD        oxyCODONE (ROXICODONE) tablet 5 mg  5 mg Oral Q4H PRN Kervin Traylor MD   5 mg at 04/01/25 0930    Or    oxyCODONE IR (ROXICODONE) tablet 10 mg  10 mg Oral Q4H PRN Kervin Traylor MD        polyethylene glycol (MIRALAX) Packet 17 g  17 g Oral Daily Kervin Traylor MD   17 g at 04/01/25 0800    prochlorperazine (COMPAZINE) injection 5 mg  5 mg Intravenous Q6H PRN Kervin Traylor MD        Or     "prochlorperazine (COMPAZINE) tablet 5 mg  5 mg Oral Q6H PRN Kervin Traylor MD        senna-docusate (SENOKOT-S/PERICOLACE) 8.6-50 MG per tablet 1 tablet  1 tablet Oral BID Kervin Traylor MD   1 tablet at 04/01/25 0800    simethicone (MYLICON) chewable tablet 80 mg  80 mg Oral Q6H PRN Kervin Traylor MD   80 mg at 04/01/25 0343    simvastatin (ZOCOR) tablet 20 mg  20 mg Oral At Bedtime Kervin Traylor MD        sodium chloride (PF) 0.9% PF flush 3 mL  3 mL Intracatheter Q8H JOHN Kervin Traylor MD   3 mL at 04/01/25 0657    sodium chloride (PF) 0.9% PF flush 3 mL  3 mL Intracatheter q1 min prn Kervin Traylor MD        tolterodine ER (DETROL LA) 24 hr capsule 4 mg  4 mg Oral Daily Kervin Traylor MD   4 mg at 04/01/25 0800                  Physical Exam:   Vitals were reviewed  Patient Vitals for the past 8 hrs:   BP Temp Temp src Pulse Resp SpO2   04/01/25 0800 -- -- -- -- -- 94 %   04/01/25 0750 123/61 99.2  F (37.3  C) Oral 86 13 96 %     GEN: NAD, lying in bed  EYES: EOMI  MOUTH: MMM  NECK: Supple  RESP: Unlabored breathing  SKIN: Warm  ABD: soft, appropriately tender, incisions C/D/I, ARTI drain with bloody serosanguineous fluid  NEURO: AAO  URO: Rendon catheter in place draining clear yudith urine           Data:   No results found for: \"NTBNPI\", \"NTBNP\"  Lab Results   Component Value Date    WBC 8.6 04/01/2025    WBC 5.8 03/25/2025    WBC 5.3 12/19/2024    HGB 10.4 (L) 04/01/2025    HGB 13.5 03/25/2025    HGB 13.8 12/19/2024    HCT 31.8 (L) 04/01/2025    HCT 41.9 03/25/2025    HCT 42.8 12/19/2024    MCV 89 04/01/2025    MCV 90 03/25/2025    MCV 90 12/19/2024     04/01/2025     03/25/2025     12/19/2024       "

## 2025-04-01 NOTE — PLAN OF CARE
Goal Outcome Evaluation:    Pertinent assessments: Assumed care of pt from [id number]. Pt A&O x4. VSS on capno w/1L O2. Previous shift dangled and stood at the side of the bed but has not been oob overnight. PIV patent, saline locked. Pain managed with scheduled toradol x2, scheduled tylenol 975mg x1, and PRN oxycodone 5mg x1. Rendon in place draining adequate pink tinged urine. ARTI drain in place draining dark red liquid output; set to bulb suction. Started passing gas overnight.    Major Shift Events: started passing gas    Treatment Plan: Pain management, tolerating diet, encourage ambulation, Rendon (will discharge with), discharge pending    Bedside Nurse: Lolly Greer RN      Plan of Care Reviewed With: patient    Overall Patient Progress: improvingOverall Patient Progress: improving    Outcome Evaluation: ARTI drain w/dark red liquid output. IV toradol x2. Scheduled tylenol x1. Capno in place w/1L O2.      Problem: Delirium  Goal: Optimal Coping  Outcome: Progressing  Goal: Improved Behavioral Control  Outcome: Progressing  Intervention: Minimize Safety Risk  Recent Flowsheet Documentation  Taken 4/1/2025 0130 by Lolly Greer, RN  Enhanced Safety Measures:   pain management   room near unit station  Trust Relationship/Rapport:   care explained   choices provided   questions answered  Goal: Improved Attention and Thought Clarity  Outcome: Progressing  Goal: Improved Sleep  Outcome: Progressing  Intervention: Promote Sleep  Recent Flowsheet Documentation  Taken 4/1/2025 0130 by Lolly Greer, RN  Sleep/Rest Enhancement: awakenings minimized     Problem: Adult Inpatient Plan of Care  Goal: Plan of Care Review  Description: The Plan of Care Review/Shift note should be completed every shift.  The Outcome Evaluation is a brief statement about your assessment that the patient is improving, declining, or no change.  This information will be displayed automatically on your shiftnote.  Outcome: Progressing  Flowsheets  "(Taken 4/1/2025 0743)  Outcome Evaluation: ARTI drain w/dark red liquid output. IV toradol x2. Scheduled tylenol x1. Capno in place w/1L O2.  Plan of Care Reviewed With: patient  Overall Patient Progress: improving  Goal: Patient-Specific Goal (Individualized)  Description: You can add care plan individualizations to a care plan. Examples of Individualization might be:  \"Parent requests to be called daily at 9am for status\", \"I have a hard time hearing out of my right ear\", or \"Do not touch me to wake me up as it startlesme\".  Outcome: Progressing  Goal: Absence of Hospital-Acquired Illness or Injury  Outcome: Progressing  Intervention: Identify and Manage Fall Risk  Recent Flowsheet Documentation  Taken 4/1/2025 0130 by Lolly Greer RN  Safety Promotion/Fall Prevention:   activity supervised   assistive device/personal items within reach   clutter free environment maintained   nonskid shoes/slippers when out of bed   safety round/check completed  Intervention: Prevent Skin Injury  Recent Flowsheet Documentation  Taken 4/1/2025 0130 by Lolly Greer RN  Body Position:   position changed independently   supine, head elevated  Intervention: Prevent and Manage VTE (Venous Thromboembolism) Risk  Recent Flowsheet Documentation  Taken 4/1/2025 0130 by Lolly Greer RN  VTE Prevention/Management: SCDs off (sequential compression devices)  Intervention: Prevent Infection  Recent Flowsheet Documentation  Taken 4/1/2025 0130 by Lolly Greer RN  Infection Prevention:   hand hygiene promoted   rest/sleep promoted   single patient room provided  Goal: Optimal Comfort and Wellbeing  Outcome: Progressing  Intervention: Monitor Pain and Promote Comfort  Recent Flowsheet Documentation  Taken 4/1/2025 0657 by Lolly Greer RN  Pain Management Interventions: medication (see MAR)  Taken 4/1/2025 0338 by Lolly Greer RN  Pain Management Interventions: medication (see MAR)  Taken 4/1/2025 0130 by Lolly Greer RN  Pain " Management Interventions: medication (see MAR)  Intervention: Provide Person-Centered Care  Recent Flowsheet Documentation  Taken 4/1/2025 0130 by Lolly Greer, RN  Trust Relationship/Rapport:   care explained   choices provided   questions answered  Goal: Readiness for Transition of Care  Outcome: Progressing

## 2025-04-01 NOTE — PLAN OF CARE
Goal Outcome Evaluation:      Plan of Care Reviewed With: patient, spouse    Overall Patient Progress: improvingOverall Patient Progress: improving    Outcome Evaluation: RN- Xfer from PACU this shift. VSS. A&O. Denies nausea. PRN Oxy once. Started on IS. Drsg intact to laft abdomen, did not require further reinforcement. Patient stood at bedside and tolerated well. ARTI drain with bright red blood. Continue current POC.      Problem: Adult Inpatient Plan of Care  Goal: Plan of Care Review  Description: The Plan of Care Review/Shift note should be completed every shift.  The Outcome Evaluation is a brief statement about your assessment that the patient is improving, declining, or no change.  This information will be displayed automatically on your shiftnote.  Outcome: Progressing  Flowsheets (Taken 3/31/2025 8855)  Outcome Evaluation: RN- Xfer from PACU this shift. VSS. A&O. Denies nausea. PRN Oxy once. Started on IS. Drsg intact to laft abdomen, did not require further reinforcement. Patient stood at bedside and tolerated well. ARTI drain with bright red blood. Continue current POC.  Plan of Care Reviewed With:   patient   spouse  Overall Patient Progress: improving      verbalization

## 2025-04-01 NOTE — DISCHARGE SUMMARY
Discharge Note    Patient discharged to home via private vehicle  accompanied by significant other .  IV: Discontinued  Prescriptions filled and given to patient/family.   Belongings reviewed and sent with patient.   Home medications returned to patient: NA  Equipment sent with: patient.   patient verbalizes understanding of discharge instructions. AVS given to patient.  Additional education completed? Rendon Catheter Care

## 2025-04-01 NOTE — DISCHARGE SUMMARY
[facility] Discharge Summary: Urology    Guillermo Strauss MRN# [id number]   Age: 69 year old YOB: 1955     Date of Admission:  3/31/2025  Date of Discharge::  4/1/2025  Admitting Physician:  Kervin Traylor MD  Discharge Physician:  Natalya Schrader PA-C           Admission Diagnoses:      Prostate cancer (H)  Prostate neoplasm          Discharge Diagnosis:     Same          Procedures:   Urethral meatal dilatation  Robotic radical prostatectomy          Medications Prior to Admission:     Medications Prior to Admission   Medication Sig Dispense Refill Last Dose/Taking    ASPIRIN 81 MG OR TABS Take by mouth.   Past Month    latanoprost (XALATAN) 0.005 % ophthalmic solution Place 1 drop into both eyes daily.   3/30/2025    lisinopril (ZESTRIL) 10 MG tablet Take 1 tablet (10 mg) by mouth daily. 90 tablet 4 3/28/2025    sildenafil (VIAGRA) 100 MG tablet Take 1 tablet (100 mg) by mouth daily as needed (ED). 6 tablet 11 More than a month    simvastatin (ZOCOR) 20 MG tablet TAKE 1 TABLET BY MOUTH AT BEDTIME. 90 tablet 4 3/30/2025    [DISCONTINUED] tamsulosin (FLOMAX) 0.4 MG capsule Take 2 capsules (0.8 mg) by mouth daily. 180 capsule 3 3/30/2025             Discharge Medications:     Current Discharge Medication List        START taking these medications    Details   ondansetron (ZOFRAN ODT) 4 MG ODT tab Take 1 tablet (4 mg) by mouth every 8 hours as needed for nausea.  Qty: 8 tablet, Refills: 0    Associated Diagnoses: Prostate neoplasm      oxyBUTYnin ER (DITROPAN XL) 15 MG 24 hr tablet Take 1 tablet (15 mg) by mouth daily.  Qty: 14 tablet, Refills: 0    Associated Diagnoses: Prostate cancer (H)      oxyCODONE (ROXICODONE) 5 MG tablet Take 1 tablet (5 mg) by mouth every 6 hours as needed for moderate pain.  Qty: 12 tablet, Refills: 0    Associated Diagnoses: Prostate neoplasm      senna-docusate (SENOKOT-S/PERICOLACE) 8.6-50 MG tablet Take 1 tablet by mouth 2 times daily as needed for  constipation.  Qty: 30 tablet, Refills: 0    Associated Diagnoses: Prostate cancer (H)           CONTINUE these medications which have NOT CHANGED    Details   ASPIRIN 81 MG OR TABS Take by mouth.    Associated Diagnoses: Routine general medical examination at a health care facility      latanoprost (XALATAN) 0.005 % ophthalmic solution Place 1 drop into both eyes daily.      lisinopril (ZESTRIL) 10 MG tablet Take 1 tablet (10 mg) by mouth daily.  Qty: 90 tablet, Refills: 4    Associated Diagnoses: Essential hypertension      sildenafil (VIAGRA) 100 MG tablet Take 1 tablet (100 mg) by mouth daily as needed (ED).  Qty: 6 tablet, Refills: 11    Associated Diagnoses: Erectile dysfunction, unspecified erectile dysfunction type      simvastatin (ZOCOR) 20 MG tablet TAKE 1 TABLET BY MOUTH AT BEDTIME.  Qty: 90 tablet, Refills: 4    Associated Diagnoses: Hyperlipidemia with target LDL less than 130           STOP taking these medications       tamsulosin (FLOMAX) 0.4 MG capsule Comments:   Reason for Stopping:                     Consultations:     None          Hospital Course:     The patient underwent the above procedure and tolerated this well. Uncomplicated post operative course. Had adequate pain control, ambulating and tolerating regular diet at the time of discharge.            Discharge Instructions and Follow-Up:     Discharge diet: Regular   Discharge activity: No lifting >10lbs or strenuous exercise for 6 week(s)   Discharge follow-up: Dr. Traylor    Wound care: Ice to area for comfort  Keep wound clean and dry           Discharge Disposition:     Discharged to home        Natalya Schrader PA-C   [facility]

## 2025-04-01 NOTE — PROGRESS NOTES
Notified provider about indwelling eubanks catheter discussed removal or continued need.    Did provider choose to remove indwelling eubanks catheter? NO    Provider's eubanks indication for keeping indwelling eubanks catheter: Indication for continued use: /GI/GYN Pelvic Procedure    Is there an order for indwelling eubanks catheter? YES    *If there is a plan to keep eubanks catheter in place at discharge daily notification with provider is not necessary, but please add a notation in the treatment team sticky note that the patient will be discharging with the catheter.

## 2025-04-07 ENCOUNTER — TELEPHONE (OUTPATIENT)
Dept: UROLOGY | Facility: CLINIC | Age: 70
End: 2025-04-07
Payer: COMMERCIAL

## 2025-04-07 LAB
PATH REPORT.COMMENTS IMP SPEC: ABNORMAL
PATH REPORT.COMMENTS IMP SPEC: ABNORMAL
PATH REPORT.COMMENTS IMP SPEC: YES
PATH REPORT.FINAL DX SPEC: ABNORMAL
PATH REPORT.GROSS SPEC: ABNORMAL
PATH REPORT.MICROSCOPIC SPEC OTHER STN: ABNORMAL
PATH REPORT.RELEVANT HX SPEC: ABNORMAL
PATHOLOGY SYNOPTIC REPORT: ABNORMAL
PHOTO IMAGE: ABNORMAL

## 2025-04-07 NOTE — TELEPHONE ENCOUNTER
"Urology Postop Phone Note:    Paresh underwent surgery on 3/31/25    POSTPROCEDURE DIAGNOSIS: Prostate cancer.   PROCEDURES PERFORMED:  1.  Urethral meatal dilatation  2.  Robotic radical prostatectomy  SURGEON: Kervin Traylor MD   ANESTHESIA: General with endotracheal intubation.   Findings: Enlarged prostate prominent median lobe, nerve sparing done on the left side  Specimens:  Prostate, seminal vesicles, Bilateral lymph nodes    Postop course:   - Admitted overnight, discharged to home on 4/1  - ARTI removal 4/1, if outputs are low  - Rendon until 04/14    Postop Call Questions:  Fevers/chills: Afebrile. No chills or diaphoresis  Eating/drinking: Yes, adequate amounts. No N/V or any other issues.    Urine output: Voiding. Adequate amounts. Clear, yellow. No bleeding.   Drains: ARTI removed in hospital. Home with Rendon.  Incisions: edges approximated, cdi. No drainage or s/s of infection. Open to air.     Bowel movement since surgery: Yes, no issues.   Pain: 2 out of 10. Pain goal:   Pain med(s): Tylenol. Stopped Oxycodone     Follow up:  Follow up appointment scheduled on 4/14 at 9:15 am with Dr. Traylor at Urology clinic in [place].    Clinic is located at [address] [place] [address]   From Sign In Desk:     Department Address: [address] [place] [address]   Department Phone: [phone]         Thank you,  CAROL Iyer, RN  Care Coordinator  [facility]rology   [facility]  My direct line: [phone]  Clinic/Schedulers: [phone]  Fax #: [phone]  Monday-Friday 8am- 4:15pm      After Hours:  If it is a night, weekend, or holiday call the [facility] number at [phone] and ask the  to page the \"urology resident on call\".    Typically, the on-call provider should return your call within 45 minutes.  Please page the on-call provider again if you haven't been contacted as " "expected.    Rarely, the on-call provider will be unable to promptly return a call due to a hospital emergency.  If you have paged twice and are still not contacted, ask the hospital  to page the \"urology chief-resident on call\".  For emergencies, or if you cannot wait for a call back: call 911 or go to the Emergency Department      Wound Care:  - You may shower and get incisions wet starting 48 hrs after surgery, unless surgeon told you otherwise.   - Do not scrub incisions or submerge wounds (bath, pool, hot tub, etc.) until catheter is removed and wounds have fully healed, typically 4-6 weeks. Blot incisions dry with clean towel, do not rub.   - Remove wound dressing 48 hours after surgery if already not removed.   - Your incisions were closed with dissolvable suture that will not need to be removed.  Commonly, purple dermabond glue is applied over the top of the sutures.  Avoid using lotions or ointments on your incisions.    - Leave incisions open to air.  Cover with gauze only if needed for comfort or to protect clothing from drainage.          "

## 2025-04-11 ENCOUNTER — TRANSFERRED RECORDS (OUTPATIENT)
Dept: HEALTH INFORMATION MANAGEMENT | Facility: CLINIC | Age: 70
End: 2025-04-11
Payer: COMMERCIAL

## 2025-04-14 ENCOUNTER — OFFICE VISIT (OUTPATIENT)
Dept: UROLOGY | Facility: CLINIC | Age: 70
End: 2025-04-14
Payer: COMMERCIAL

## 2025-04-14 VITALS — SYSTOLIC BLOOD PRESSURE: 132 MMHG | DIASTOLIC BLOOD PRESSURE: 70 MMHG

## 2025-04-14 DIAGNOSIS — C61 PROSTATE CANCER (H): Primary | ICD-10-CM

## 2025-04-14 DIAGNOSIS — Z79.2 PROPHYLACTIC ANTIBIOTIC: ICD-10-CM

## 2025-04-14 PROCEDURE — 1125F AMNT PAIN NOTED PAIN PRSNT: CPT | Performed by: STUDENT IN AN ORGANIZED HEALTH CARE EDUCATION/TRAINING PROGRAM

## 2025-04-14 PROCEDURE — 99024 POSTOP FOLLOW-UP VISIT: CPT | Performed by: STUDENT IN AN ORGANIZED HEALTH CARE EDUCATION/TRAINING PROGRAM

## 2025-04-14 PROCEDURE — 3075F SYST BP GE 130 - 139MM HG: CPT | Performed by: STUDENT IN AN ORGANIZED HEALTH CARE EDUCATION/TRAINING PROGRAM

## 2025-04-14 PROCEDURE — 3078F DIAST BP <80 MM HG: CPT | Performed by: STUDENT IN AN ORGANIZED HEALTH CARE EDUCATION/TRAINING PROGRAM

## 2025-04-14 RX ORDER — CIPROFLOXACIN 500 MG/1
500 TABLET, FILM COATED ORAL ONCE
Qty: 1 TABLET | Refills: 0 | Status: SHIPPED | OUTPATIENT
Start: 2025-04-14 | End: 2025-04-14

## 2025-04-14 ASSESSMENT — PAIN SCALES - GENERAL: PAINLEVEL_OUTOF10: MILD PAIN (2)

## 2025-04-14 NOTE — LETTER
4/14/2025       RE: Guillermo Strauss  [address]  [place] [address]     Dear Colleague,    Thank you for referring your patient, Guillermo Strauss, to the [facility] [place] at [facility]. Please see a copy of my visit note below.    CHIEF COMPLAINT   It was my pleasure to see Guillermo Strauss who is a 69 year old male for follow-up of robotic prostatectomy.      HPI:  Guillermo Strauss is a 69 year old male being seen for postoperative follow-up and catheter removal.  Duration of problem: 2 weeks  Previous treatments: Robotic prostatectomy      Reviewed previous notes  Denies any specific issues  Here for catheter removal    Exam:  /70   General: age-appropriate appearing male in NAD sitting in an exam chair  Resp: no respiratory distress  CV: heart rate regular  Abdomen: Degree of obesity is none. Abdomen is soft and nontender. No organomegaly. Surgical scars include postop port site incisions healing well.  : Normal  Neuro: grossly non focal. Normal reflexes  Motor: excellent strength throughout    Review of Imaging:  The following imaging exams were independently viewed and interpreted by me and discussed with patient:      Review of Labs:  The following labs were reviewed by me and discussed with the patient:  Surgical pathology: posted: 9/20/2023PROSTATE GLAND: RADICAL PROSTATECTOMY - All Specimens  SPECIMEN   Procedure  Radical prostatectomy   Prostate Size     Prostate Weight (Grams)  73.8 g   Prostate Greatest Dimension (Centimeters)  4.8 cm   Additional Prostate Dimension (Centimeters)  3.9 cm     3.4 cm   TUMOR   Histologic Type  Acinar adenocarcinoma, conventional (usual)   Histologic Grade     Grade  Grade group 2 (Vero Score 3 + 4 = 7)   Percentage of Pattern 4  11 - 20%   Intraductal Carcinoma (IDC)  Not identified   Cribriform Glands  Present   Treatment Effect  No known presurgical therapy   TUMOR QUANTITATION      Estimated Percentage of Prostate Involved by Tumor  6 - 10%   Extraprostatic Extension (EPE)  Not identified   Urinary Bladder Neck Invasion  Not identified   Seminal Vesicle Invasion  Not identified   Lymphatic and / or Vascular Invasion  Not Identified   Perineural Invasion  Present   MARGINS   Margin Status  All margins negative for invasive carcinoma   REGIONAL LYMPH NODES   Regional Lymph Node Status  Not applicable (no regional lymph nodes submitted or found)   pTNM CLASSIFICATION (AJCC 8th Edition)   Reporting of pT, pN, and (when applicable) pM categories is based on information available to the pathologist at the time the report is issued. As per the AJCC (Chapter 1, 8th Ed.) it is the managing physician s responsibility to establish the final pathologic stage based upon all pertinent information, including but potentially not limited to this pathology report.   pT Category  pT2   pN Category  pN not assigned (no nodes submitted or found)       Assessment & Plan      Prostate cancer (H)  Discussed about Results of T2 prostate cancer  Lymph node dissection was not performed in view of low risk of lymph node metastasis  Recommended follow-up in 3 months and discussed about ED as well as possible incontinence  Will encourage Kegel exercises and discussed possible pelvic floor rehabilitation in case symptoms are worsened at about 6 weeks from surgery  Also talked about Viagra as a penile rehabilitative therapy  Will see him in 3 months with PSA  - PSA tumor marker [[id number]]; Future  - ciprofloxacin (CIPRO) 500 MG tablet; Take 1 tablet (500 mg) by mouth once for 1 dose.    Kervin Traylor MD  [facility] [place]      ==========================    Additional Billing and Coding Information:  Review of external notes as documented above   Review of the result(s) of each unique test - surgical pathology                20 minutes spent by me on the date of the encounter doing chart review,  review of test results, interpretation of tests, patient visit, and documentation     ==========================      Again, thank you for allowing me to participate in the care of your patient.      Sincerely,    Kervin Traylor MD

## 2025-04-14 NOTE — PROGRESS NOTES
CHIEF COMPLAINT   It was my pleasure to see Guillermo Strauss who is a 69 year old male for follow-up of robotic prostatectomy.      HPI:  Guillermo Strauss is a 69 year old male being seen for postoperative follow-up and catheter removal.  Duration of problem: 2 weeks  Previous treatments: Robotic prostatectomy      Reviewed previous notes  Denies any specific issues  Here for catheter removal    Exam:  /70   General: age-appropriate appearing male in NAD sitting in an exam chair  Resp: no respiratory distress  CV: heart rate regular  Abdomen: Degree of obesity is none. Abdomen is soft and nontender. No organomegaly. Surgical scars include postop port site incisions healing well.  : Normal  Neuro: grossly non focal. Normal reflexes  Motor: excellent strength throughout    Review of Imaging:  The following imaging exams were independently viewed and interpreted by me and discussed with patient:      Review of Labs:  The following labs were reviewed by me and discussed with the patient:  Surgical pathology: posted: 9/20/2023PROSTATE GLAND: RADICAL PROSTATECTOMY - All Specimens  SPECIMEN   Procedure  Radical prostatectomy   Prostate Size     Prostate Weight (Grams)  73.8 g   Prostate Greatest Dimension (Centimeters)  4.8 cm   Additional Prostate Dimension (Centimeters)  3.9 cm     3.4 cm   TUMOR   Histologic Type  Acinar adenocarcinoma, conventional (usual)   Histologic Grade     Grade  Grade group 2 (Philadelphia Score 3 + 4 = 7)   Percentage of Pattern 4  11 - 20%   Intraductal Carcinoma (IDC)  Not identified   Cribriform Glands  Present   Treatment Effect  No known presurgical therapy   TUMOR QUANTITATION     Estimated Percentage of Prostate Involved by Tumor  6 - 10%   Extraprostatic Extension (EPE)  Not identified   Urinary Bladder Neck Invasion  Not identified   Seminal Vesicle Invasion  Not identified   Lymphatic and / or Vascular Invasion  Not Identified   Perineural Invasion  Present   MARGINS   Margin Status   All margins negative for invasive carcinoma   REGIONAL LYMPH NODES   Regional Lymph Node Status  Not applicable (no regional lymph nodes submitted or found)   pTNM CLASSIFICATION (AJCC 8th Edition)   Reporting of pT, pN, and (when applicable) pM categories is based on information available to the pathologist at the time the report is issued. As per the AJCC (Chapter 1, 8th Ed.) it is the managing physician s responsibility to establish the final pathologic stage based upon all pertinent information, including but potentially not limited to this pathology report.   pT Category  pT2   pN Category  pN not assigned (no nodes submitted or found)       Assessment & Plan       Prostate cancer (H)  Discussed about Results of T2 prostate cancer  Lymph node dissection was not performed in view of low risk of lymph node metastasis  Recommended follow-up in 3 months and discussed about ED as well as possible incontinence  Will encourage Kegel exercises and discussed possible pelvic floor rehabilitation in case symptoms are worsened at about 6 weeks from surgery  Also talked about Viagra as a penile rehabilitative therapy  Will see him in 3 months with PSA  - PSA tumor marker [[id number]]; Future  - ciprofloxacin (CIPRO) 500 MG tablet; Take 1 tablet (500 mg) by mouth once for 1 dose.    Kervin Traylor MD  [facility] [place]      ==========================    Additional Billing and Coding Information:  Review of external notes as documented above   Review of the result(s) of each unique test - surgical pathology                20 minutes spent by me on the date of the encounter doing chart review, review of test results, interpretation of tests, patient visit, and documentation     ==========================

## 2025-04-14 NOTE — NURSING NOTE
Chief Complaint   Patient presents with    Prostate Cancer     Patient's catheter was disconnected from the leg bag and the balloon deflated. The catheter was removed with no complaints offered by the patient and the procedure was tolerated well.      Pt sent 1 tablet Cipro 500 mg to pharmacy to take today    Hilda Vasquez, Clinic Assistant

## (undated) DEVICE — SYSTEM PSTN 29X20X1IN PNK PD LF NS 40580 (COI)

## (undated) DEVICE — NDL INSUFFLATION 13GA 120MM C2201

## (undated) DEVICE — DRAIN JACKSON PRATT RESERVOIR 100ML SU130-1305

## (undated) DEVICE — SU DERMABOND ADVANCED .7ML DNX12

## (undated) DEVICE — SU WND CLOSURE VLOC 180 ABS 3-0 6" V-20 VLOCL0604

## (undated) DEVICE — SPONGE RAY-TEC 4X8" 7318

## (undated) DEVICE — SUCTION IRR STRYKERFLOW II W/TIP 250-070-520

## (undated) DEVICE — SU VICRYL+ 0 27IN CT-2 VLT VCP334H

## (undated) DEVICE — CATH FOLEY 16FR 5ML LUBRICATH LATEX 0165L16

## (undated) DEVICE — CLIP ENDO HEMO-LOC PURPLE LG 544240

## (undated) DEVICE — GLOVE ESTEEM POWDER FREE SMT 7.0  2D72PT70

## (undated) DEVICE — DAVINCI HOT SHEARS TIP COVER  400180

## (undated) DEVICE — ENDO POUCH UNIV RETRIEVAL SYSTEM INZII 10MM CD001

## (undated) DEVICE — SU SILK 2-0 PSL 18" 673H

## (undated) DEVICE — SU STRATAFIX PDS PLUS 3-0 SPIRAL SH 15CM SXPP1B420

## (undated) DEVICE — DAVINCI XI DRAPE ARM 470015

## (undated) DEVICE — DAVINCI XI SEAL UNIVERSAL 5-12MM 470500

## (undated) DEVICE — SU MONOCRYL 4-0 PS-2 18" UND Y496G

## (undated) DEVICE — SOLUTION IV 0.9% NACL 1000ML E8000

## (undated) DEVICE — PACK DAVINCI UROLOGY SBA15UDFSG

## (undated) DEVICE — GLOVE BIOGEL PI MICRO SZ 6.5 48565

## (undated) DEVICE — CATH FOLEY 20FR 5ML LUBRICATH LATEX 0165L20

## (undated) DEVICE — SOLUTION WATER 1000ML R5000-01

## (undated) DEVICE — ENDO TROCAR CONMED AIRSEAL BLADELESS 12X120MM IAS12-120LP

## (undated) DEVICE — DRAIN JACKSON PRATT ROUND SIL 19FR W/TROCAR LF JP-2232

## (undated) DEVICE — SU WND CLOSURE VLOC 90 ABS 3-0 VIOLET 6" CV-23 VLOCM0804

## (undated) DEVICE — SU VICRYL 0 TIE 12X18" J906G

## (undated) DEVICE — DEVICE SUTURE PASSER 14GA WECK EFX EFXSP2

## (undated) DEVICE — PREP DYNA-HEX 4% CHG SCRUB 4OZ BOTTLE MDS098710

## (undated) DEVICE — SURGICEL POWDER ABSORBABLE HEMOSTAT 3GM 3013SP

## (undated) DEVICE — SU WND CLOSURE V-LOC 3-0 CV-23 6" VLOCM1904

## (undated) DEVICE — TUBING FILTER TRI-LUMEN AIRSEAL ASC-EVAC1

## (undated) DEVICE — CATH FOLEY 18FR 5ML LUBRICATH LATEX 0165L18

## (undated) DEVICE — LINEN ORTHO PACK 5446

## (undated) DEVICE — DAVINCI XI DRAPE COLUMN 470341

## (undated) DEVICE — GLOVE BIOGEL PI MICRO INDICATOR UNDERGLOVE SZ 7.0 48970

## (undated) DEVICE — SU VICRYL+ 0 27 UR6 VLT VCP603H

## (undated) DEVICE — APPLICATOR ENDOSCOPIC 5 SURGICEL POWDER 3123SPEA

## (undated) DEVICE — PROTECTOR ARM ONE-STEP TRENDELENBURG 40418 (COI)

## (undated) DEVICE — ANTIFOG SOLUTION SEE SHARP 150M TROCAR SWABS 30978

## (undated) RX ORDER — PROPOFOL 10 MG/ML
INJECTION, EMULSION INTRAVENOUS
Status: DISPENSED
Start: 2025-03-31

## (undated) RX ORDER — DEXAMETHASONE SODIUM PHOSPHATE 4 MG/ML
INJECTION, SOLUTION INTRA-ARTICULAR; INTRALESIONAL; INTRAMUSCULAR; INTRAVENOUS; SOFT TISSUE
Status: DISPENSED
Start: 2025-03-31

## (undated) RX ORDER — LIDOCAINE HYDROCHLORIDE 10 MG/ML
INJECTION, SOLUTION EPIDURAL; INFILTRATION; INTRACAUDAL; PERINEURAL
Status: DISPENSED
Start: 2025-03-31

## (undated) RX ORDER — FENTANYL CITRATE 50 UG/ML
INJECTION, SOLUTION INTRAMUSCULAR; INTRAVENOUS
Status: DISPENSED
Start: 2025-03-31

## (undated) RX ORDER — ONDANSETRON 2 MG/ML
INJECTION INTRAMUSCULAR; INTRAVENOUS
Status: DISPENSED
Start: 2025-03-31

## (undated) RX ORDER — ACETAMINOPHEN 325 MG/1
TABLET ORAL
Status: DISPENSED
Start: 2025-03-31

## (undated) RX ORDER — LABETALOL HYDROCHLORIDE 5 MG/ML
INJECTION, SOLUTION INTRAVENOUS
Status: DISPENSED
Start: 2025-03-31

## (undated) RX ORDER — BUPIVACAINE HYDROCHLORIDE 2.5 MG/ML
INJECTION, SOLUTION EPIDURAL; INFILTRATION; INTRACAUDAL; PERINEURAL
Status: DISPENSED
Start: 2025-03-31

## (undated) RX ORDER — GLYCOPYRROLATE 0.2 MG/ML
INJECTION, SOLUTION INTRAMUSCULAR; INTRAVENOUS
Status: DISPENSED
Start: 2025-03-31

## (undated) RX ORDER — CEFAZOLIN SODIUM/WATER 2 G/20 ML
SYRINGE (ML) INTRAVENOUS
Status: DISPENSED
Start: 2025-03-31

## (undated) RX ORDER — EPHEDRINE SULFATE 50 MG/ML
INJECTION, SOLUTION INTRAMUSCULAR; INTRAVENOUS; SUBCUTANEOUS
Status: DISPENSED
Start: 2025-03-31